# Patient Record
Sex: MALE | Race: WHITE | NOT HISPANIC OR LATINO | Employment: OTHER | URBAN - METROPOLITAN AREA
[De-identification: names, ages, dates, MRNs, and addresses within clinical notes are randomized per-mention and may not be internally consistent; named-entity substitution may affect disease eponyms.]

---

## 2023-10-04 PROBLEM — R13.12 DYSPHAGIA, OROPHARYNGEAL PHASE: Status: ACTIVE | Noted: 2023-10-04

## 2023-10-04 PROBLEM — J31.0 CHRONIC RHINITIS: Status: ACTIVE | Noted: 2023-10-04

## 2023-10-04 PROBLEM — J38.7 VALLECULAR CYST: Status: ACTIVE | Noted: 2023-10-04

## 2023-10-04 RX ORDER — MIRTAZAPINE 15 MG/1
TABLET, FILM COATED ORAL
COMMUNITY
Start: 2023-04-25

## 2023-10-04 RX ORDER — TAMSULOSIN HYDROCHLORIDE 0.4 MG/1
CAPSULE ORAL
COMMUNITY
Start: 2020-06-25 | End: 2023-10-06 | Stop reason: ALTCHOICE

## 2023-10-04 RX ORDER — VIT C/E/ZN/COPPR/LUTEIN/ZEAXAN 250MG-90MG
CAPSULE ORAL DAILY
COMMUNITY
End: 2024-04-05 | Stop reason: ALTCHOICE

## 2023-10-04 RX ORDER — CETIRIZINE HYDROCHLORIDE 10 MG/1
1 TABLET ORAL DAILY
COMMUNITY
Start: 2023-05-25

## 2023-10-04 RX ORDER — POLYETHYLENE GLYCOL 3350 17 G/17G
POWDER, FOR SOLUTION ORAL
COMMUNITY
Start: 2023-04-19 | End: 2024-04-05 | Stop reason: ALTCHOICE

## 2023-10-04 RX ORDER — HYDROCORTISONE ACETATE 25 MG/1
SUPPOSITORY RECTAL
COMMUNITY
Start: 2023-03-15 | End: 2024-04-05 | Stop reason: ALTCHOICE

## 2023-10-04 RX ORDER — SENNOSIDES 8.6 MG/1
TABLET ORAL
COMMUNITY
Start: 2023-04-19

## 2023-10-04 RX ORDER — ROSUVASTATIN CALCIUM 20 MG/1
TABLET, COATED ORAL DAILY
COMMUNITY
Start: 2008-01-29

## 2023-10-04 RX ORDER — MULTIVIT-MIN/IRON/FOLIC ACID/K 18-600-40
CAPSULE ORAL
COMMUNITY
Start: 2020-06-25

## 2023-10-04 RX ORDER — WHEAT DEXTRIN 3 G/3.5 G
POWDER IN PACKET (EA) ORAL AS NEEDED
COMMUNITY
End: 2023-10-06

## 2023-10-04 RX ORDER — MONTELUKAST SODIUM 10 MG/1
1 TABLET ORAL DAILY
COMMUNITY
Start: 2023-05-03 | End: 2024-04-05 | Stop reason: ALTCHOICE

## 2023-10-04 RX ORDER — PHENOL 1.4 %
AEROSOL, SPRAY (ML) MUCOUS MEMBRANE
COMMUNITY
Start: 2020-06-25

## 2023-10-04 RX ORDER — PROPRANOLOL HYDROCHLORIDE 20 MG/1
TABLET ORAL
COMMUNITY
Start: 2020-06-25 | End: 2023-10-06 | Stop reason: ALTCHOICE

## 2023-10-04 RX ORDER — CARBIDOPA AND LEVODOPA 25; 250 MG/1; MG/1
TABLET ORAL
COMMUNITY
Start: 2020-07-17 | End: 2023-10-06 | Stop reason: DRUGHIGH

## 2023-10-04 RX ORDER — OMEPRAZOLE 20 MG/1
CAPSULE, DELAYED RELEASE ORAL 2 TIMES DAILY
COMMUNITY
Start: 2020-06-25 | End: 2024-04-05 | Stop reason: ALTCHOICE

## 2023-10-04 RX ORDER — LORATADINE 10 MG/1
1 TABLET ORAL DAILY
COMMUNITY
Start: 2022-04-22 | End: 2024-04-05 | Stop reason: ALTCHOICE

## 2023-10-04 RX ORDER — NAPROXEN SODIUM 220 MG/1
TABLET ORAL
COMMUNITY
Start: 2020-06-25

## 2023-10-04 RX ORDER — ASPIRIN 81 MG/1
TABLET ORAL DAILY
COMMUNITY
Start: 2008-01-29

## 2023-10-06 ENCOUNTER — OFFICE VISIT (OUTPATIENT)
Dept: PRIMARY CARE | Facility: CLINIC | Age: 83
End: 2023-10-06
Payer: COMMERCIAL

## 2023-10-06 VITALS
SYSTOLIC BLOOD PRESSURE: 110 MMHG | TEMPERATURE: 97.3 F | DIASTOLIC BLOOD PRESSURE: 70 MMHG | RESPIRATION RATE: 16 BRPM | BODY MASS INDEX: 26.78 KG/M2 | HEART RATE: 60 BPM | WEIGHT: 171 LBS

## 2023-10-06 DIAGNOSIS — K21.9 GASTROESOPHAGEAL REFLUX DISEASE, UNSPECIFIED WHETHER ESOPHAGITIS PRESENT: ICD-10-CM

## 2023-10-06 DIAGNOSIS — E78.5 HYPERLIPIDEMIA, UNSPECIFIED HYPERLIPIDEMIA TYPE: ICD-10-CM

## 2023-10-06 DIAGNOSIS — J31.0 CHRONIC RHINITIS: ICD-10-CM

## 2023-10-06 DIAGNOSIS — G20.A1 PARKINSON'S DISEASE, UNSPECIFIED WHETHER DYSKINESIA PRESENT, UNSPECIFIED WHETHER MANIFESTATIONS FLUCTUATE (MULTI): ICD-10-CM

## 2023-10-06 DIAGNOSIS — I10 ESSENTIAL HYPERTENSION: ICD-10-CM

## 2023-10-06 DIAGNOSIS — I25.10 CORONARY ARTERIOSCLEROSIS: Primary | ICD-10-CM

## 2023-10-06 PROCEDURE — 1159F MED LIST DOCD IN RCRD: CPT | Performed by: FAMILY MEDICINE

## 2023-10-06 PROCEDURE — 1036F TOBACCO NON-USER: CPT | Performed by: FAMILY MEDICINE

## 2023-10-06 PROCEDURE — 3078F DIAST BP <80 MM HG: CPT | Performed by: FAMILY MEDICINE

## 2023-10-06 PROCEDURE — 99213 OFFICE O/P EST LOW 20 MIN: CPT | Performed by: FAMILY MEDICINE

## 2023-10-06 PROCEDURE — 1160F RVW MEDS BY RX/DR IN RCRD: CPT | Performed by: FAMILY MEDICINE

## 2023-10-06 PROCEDURE — 3074F SYST BP LT 130 MM HG: CPT | Performed by: FAMILY MEDICINE

## 2023-10-06 RX ORDER — CARBIDOPA AND LEVODOPA 25; 250 MG/1; MG/1
1.5 TABLET ORAL 4 TIMES DAILY
Qty: 180 TABLET | Refills: 0 | Status: SHIPPED | OUTPATIENT
Start: 2023-10-06 | End: 2024-04-05

## 2023-10-06 NOTE — PROGRESS NOTES
Subjective   Patient ID: Luis Armando Malin is a 83 y.o. male who presents for Follow-up (4 mon fu ).  HPI  patient with hx of parkinson disease and DVT post treatment   here for follow up visit   feeling well   other than congestio   no cough , no SOB , no fever, no chills.     congested mianly in the morning   he has post nasal drip becuase of clering his throat breasting is fine , no fever.   He is tired all the time     [facility] , [facility] , [facility] , meloixam   No changes in meds     Jarvis is a  , [facility]       Review of Systems    Past Medical History:   Diagnosis Date    Heart disease, unspecified     Heart problem    Personal history of other diseases of the nervous system and sense organs     History of Parkinson's disease    Personal history of other endocrine, nutritional and metabolic disease     History of high cholesterol       Past Surgical History:   Procedure Laterality Date    OTHER SURGICAL HISTORY  06/25/2020    Hernia repair    OTHER SURGICAL HISTORY  06/25/2020    Gallbladder surgery    OTHER SURGICAL HISTORY  06/25/2020    Shoulder surgery    OTHER SURGICAL HISTORY  06/25/2020    Sinus surgery    OTHER SURGICAL HISTORY  06/25/2020    Elbow surgery      Social History     Socioeconomic History    Marital status:      Spouse name: None    Number of children: None    Years of education: None    Highest education level: None   Occupational History    None   Tobacco Use    Smoking status: Never    Smokeless tobacco: Never   Substance and Sexual Activity    Alcohol use: None    Drug use: None    Sexual activity: None   Other Topics Concern    None   Social History Narrative    None     Social Determinants of Health     Financial Resource Strain: Not on file   Food Insecurity: Not on file   Transportation Needs: Not on file   Physical Activity: Not on file   Stress: Not on file   Social Connections: Not on file   Intimate Partner Violence: Not on file   Housing  Stability: Not on file      Family History   Problem Relation Name Age of Onset    Cancer Father         MEDICATIONS AND ALLERGIES:    ALLERGIES Atorvastatin, Clarithromycin, and Penicillins    MEDICATIONS   Current Outpatient Medications on File Prior to Visit   Medication Sig Dispense Refill    ascorbic acid, vitamin C, 500 mg capsule Take by mouth.      aspirin 81 mg EC tablet Take by mouth once daily.      cetirizine (ZyrTEC) 10 mg tablet Take 1 tablet (10 mg) by mouth once daily.      cholecalciferol (Vitamin D-3) 25 MCG (1000 UT) capsule Take by mouth once daily.      hydrocortisone (Anusol-HC) 25 mg suppository UNWRAP AND INSERT 1 SUPPOSITORY IN RECTUM THREE TIMES A DAY AS NEEDED FOR HEMORRHOIDS      loratadine (Claritin) 10 mg tablet Take 1 tablet (10 mg) by mouth once daily.      mirtazapine (Remeron) 15 mg tablet Take 1/2 Pill AT Bedtime For 2 Weeks Then Increase To A Full Pill AT Bedtime      montelukast (Singulair) 10 mg tablet Take 1 tablet (10 mg) by mouth once daily.      multivitamin with minerals (Adults Multivitamin) tablet Take by mouth.      NON FORMULARY Vitamin c caps      omega 3-dha-epa-fish oil (Fish OiL) 1,200 (144-216) mg capsule Take by mouth.      omeprazole (PriLOSEC) 20 mg DR capsule Take by mouth 2 times a day.      polyethylene glycol (Glycolax, Miralax) 17 gram/dose powder STIR 1 CAPFUL (17GM) IN 8 OUNCES OF LIQUID AND TAKE BY MOUTH TWICE A DAY FOR CONSTIPATION *17GM EQUALS 1 CAPFUL OR 1 PACKET. MIX WITH 8 OUNCES OF LIQUID AND TAKE BY MOUTH AS INSTRUCTED ON PACKAGING*      prucalopride (Motegrity) 1 mg tablet tablet Take 1 tablet (1 mg) by mouth once daily. Every afternoon      psyllium (Metamucil) 3.4 gram packet Take by mouth.      rosuvastatin (Crestor) 20 mg tablet Take by mouth once daily.      sennosides (Senokot) 8.6 mg tablet Take by mouth.      [DISCONTINUED] carbidopa-levodopa (Sinemet)  mg tablet Take by mouth.      [DISCONTINUED] propranolol (Inderal) 20 mg tablet  Take by mouth.      [DISCONTINUED] SAW PALMETTO ORAL Take by mouth.      [DISCONTINUED] tamsulosin (Flomax) 0.4 mg 24 hr capsule Take by mouth.      [DISCONTINUED] wheat dextrin (Benefiber Clear SF, dextrin,) 3 gram/3.5 gram powder in packet if needed.       No current facility-administered medications on file prior to visit.        Objective   Visit Vitals  /70   Pulse 60   Temp 36.3 °C (97.3 °F)   Resp 16   Wt 77.6 kg (171 lb)   BMI 26.78 kg/m²   Smoking Status Never   BSA 1.92 m²      Physical Exam  Constitutional:       Appearance: Normal appearance.   HENT:      Head: Normocephalic and atraumatic.   Eyes:      Pupils: Pupils are equal, round, and reactive to light.   Cardiovascular:      Rate and Rhythm: Normal rate.   Pulmonary:      Effort: Pulmonary effort is normal.   Musculoskeletal:         General: No swelling.      Cervical back: Normal range of motion.   Skin:     Coloration: Skin is not jaundiced.   Neurological:      General: No focal deficit present.      Mental Status: He is alert and oriented to person, place, and time.             1. Coronary arteriosclerosis        2. Essential hypertension        3. Hyperlipidemia, unspecified hyperlipidemia type        4. Chronic rhinitis        5. Gastroesophageal reflux disease, unspecified whether esophagitis present        6. Parkinson's disease, unspecified whether dyskinesia present, unspecified whether manifestations fluctuate          1. Coronary arteriosclerosis  Following with cardilgy   No acute complaints.     2. Essential hypertension  Well controlled   Continue current treamtent    3. Hyperlipidemia, unspecified hyperlipidemia type  Stable   Labs reviewed in the normal range.     4. Chronic rhinitis  Resolved for now     5. Gastroesophageal reflux disease, unspecified whether esophagitis present  Stable     6. Parkinson's disease, unspecified whether dyskinesia present, unspecified whether manifestations fluctuate  Following with neurogoy

## 2023-11-05 DIAGNOSIS — K21.9 GASTROESOPHAGEAL REFLUX DISEASE, UNSPECIFIED WHETHER ESOPHAGITIS PRESENT: Primary | ICD-10-CM

## 2023-11-06 RX ORDER — OMEPRAZOLE 40 MG/1
CAPSULE, DELAYED RELEASE ORAL
Qty: 90 CAPSULE | Refills: 0 | Status: SHIPPED | OUTPATIENT
Start: 2023-11-06 | End: 2024-02-02

## 2023-11-29 ENCOUNTER — OFFICE VISIT (OUTPATIENT)
Dept: PRIMARY CARE | Facility: CLINIC | Age: 83
End: 2023-11-29
Payer: COMMERCIAL

## 2023-11-29 VITALS
BODY MASS INDEX: 28.19 KG/M2 | TEMPERATURE: 97.1 F | SYSTOLIC BLOOD PRESSURE: 120 MMHG | DIASTOLIC BLOOD PRESSURE: 74 MMHG | WEIGHT: 180 LBS | HEART RATE: 62 BPM | RESPIRATION RATE: 16 BRPM

## 2023-11-29 DIAGNOSIS — M25.511 ACUTE PAIN OF RIGHT SHOULDER: Primary | ICD-10-CM

## 2023-11-29 PROCEDURE — 3074F SYST BP LT 130 MM HG: CPT | Performed by: FAMILY MEDICINE

## 2023-11-29 PROCEDURE — 3078F DIAST BP <80 MM HG: CPT | Performed by: FAMILY MEDICINE

## 2023-11-29 PROCEDURE — 1159F MED LIST DOCD IN RCRD: CPT | Performed by: FAMILY MEDICINE

## 2023-11-29 PROCEDURE — 1160F RVW MEDS BY RX/DR IN RCRD: CPT | Performed by: FAMILY MEDICINE

## 2023-11-29 PROCEDURE — 99213 OFFICE O/P EST LOW 20 MIN: CPT | Performed by: FAMILY MEDICINE

## 2023-11-29 PROCEDURE — 1036F TOBACCO NON-USER: CPT | Performed by: FAMILY MEDICINE

## 2023-11-29 RX ORDER — METHYLPREDNISOLONE 4 MG/1
TABLET ORAL
Qty: 21 TABLET | Refills: 0 | Status: SHIPPED | OUTPATIENT
Start: 2023-11-29 | End: 2023-12-06

## 2023-11-29 NOTE — PROGRESS NOTES
Subjective   Patient ID: Luis Armando Malin is a 83 y.o. male who presents for Follow-up (Soreness under right armpit , pain off and on ).  HPI  Pain the right axilla   No lesion seen   No lumph nodes   More with moving the arms in certain mvts   No signs of infection     Review of Systems    Past Medical History:   Diagnosis Date    Heart disease, unspecified     Heart problem    Personal history of other diseases of the nervous system and sense organs     History of Parkinson's disease    Personal history of other endocrine, nutritional and metabolic disease     History of high cholesterol       Past Surgical History:   Procedure Laterality Date    OTHER SURGICAL HISTORY  06/25/2020    Hernia repair    OTHER SURGICAL HISTORY  06/25/2020    Gallbladder surgery    OTHER SURGICAL HISTORY  06/25/2020    Shoulder surgery    OTHER SURGICAL HISTORY  06/25/2020    Sinus surgery    OTHER SURGICAL HISTORY  06/25/2020    Elbow surgery      Social History     Socioeconomic History    Marital status:      Spouse name: None    Number of children: None    Years of education: None    Highest education level: None   Occupational History    None   Tobacco Use    Smoking status: Never    Smokeless tobacco: Never   Substance and Sexual Activity    Alcohol use: None    Drug use: None    Sexual activity: None   Other Topics Concern    None   Social History Narrative    None     Social Determinants of Health     Financial Resource Strain: Not on file   Food Insecurity: Not on file   Transportation Needs: Not on file   Physical Activity: Not on file   Stress: Not on file   Social Connections: Not on file   Intimate Partner Violence: Not on file   Housing Stability: Not on file      Family History   Problem Relation Name Age of Onset    Cancer Father         MEDICATIONS AND ALLERGIES:    ALLERGIES Atorvastatin, Clarithromycin, and Penicillins    MEDICATIONS   Current Outpatient Medications on File Prior to Visit   Medication Sig  Dispense Refill    ascorbic acid, vitamin C, 500 mg capsule Take by mouth.      aspirin 81 mg EC tablet Take by mouth once daily.      carbidopa-levodopa (Sinemet)  mg tablet Take 1.5 tablets by mouth 4 times a day. 180 tablet 0    cetirizine (ZyrTEC) 10 mg tablet Take 1 tablet (10 mg) by mouth once daily.      cholecalciferol (Vitamin D-3) 25 MCG (1000 UT) capsule Take by mouth once daily.      hydrocortisone (Anusol-HC) 25 mg suppository UNWRAP AND INSERT 1 SUPPOSITORY IN RECTUM THREE TIMES A DAY AS NEEDED FOR HEMORRHOIDS      loratadine (Claritin) 10 mg tablet Take 1 tablet (10 mg) by mouth once daily.      mirtazapine (Remeron) 15 mg tablet Take 1/2 Pill AT Bedtime For 2 Weeks Then Increase To A Full Pill AT Bedtime      montelukast (Singulair) 10 mg tablet Take 1 tablet (10 mg) by mouth once daily.      multivitamin with minerals (Adults Multivitamin) tablet Take by mouth.      NON FORMULARY Vitamin c caps      omega 3-dha-epa-fish oil (Fish OiL) 1,200 (144-216) mg capsule Take by mouth.      omeprazole (PriLOSEC) 20 mg DR capsule Take by mouth 2 times a day.      omeprazole (PriLOSEC) 40 mg DR capsule 1 BY MOUTH ONCE DAILY ( INCREASED FROM 20MG ON 6/9/23) 90 capsule 0    polyethylene glycol (Glycolax, Miralax) 17 gram/dose powder STIR 1 CAPFUL (17GM) IN 8 OUNCES OF LIQUID AND TAKE BY MOUTH TWICE A DAY FOR CONSTIPATION *17GM EQUALS 1 CAPFUL OR 1 PACKET. MIX WITH 8 OUNCES OF LIQUID AND TAKE BY MOUTH AS INSTRUCTED ON PACKAGING*      prucalopride (Motegrity) 1 mg tablet tablet Take 1 tablet (1 mg) by mouth once daily. Every afternoon      psyllium (Metamucil) 3.4 gram packet Take by mouth.      rosuvastatin (Crestor) 20 mg tablet Take by mouth once daily.      sennosides (Senokot) 8.6 mg tablet Take by mouth.       No current facility-administered medications on file prior to visit.        Objective   Visit Vitals  /74   Pulse 62   Temp 36.2 °C (97.1 °F)   Resp 16   Wt 81.6 kg (180 lb)   BMI 28.19  kg/m²   Smoking Status Never   BSA 1.96 m²      Physical Exam  Pain on palpating the R axilla   No masses felt.     1. Acute pain of right shoulder  Pain in the axialla   No lesions seen   Will treat with medrol dose pack   Advised strecthing exericse   Contact us if not getting better for further testing   Patient is agreable.   - methylPREDNISolone (Medrol Dospak) 4 mg tablets; Take as directed on package.  Dispense: 21 tablet; Refill: 0

## 2024-02-02 DIAGNOSIS — K21.9 GASTROESOPHAGEAL REFLUX DISEASE, UNSPECIFIED WHETHER ESOPHAGITIS PRESENT: ICD-10-CM

## 2024-02-02 RX ORDER — OMEPRAZOLE 40 MG/1
CAPSULE, DELAYED RELEASE ORAL
Qty: 90 CAPSULE | Refills: 0 | Status: SHIPPED | OUTPATIENT
Start: 2024-02-02 | End: 2024-04-05 | Stop reason: SDUPTHER

## 2024-04-05 ENCOUNTER — OFFICE VISIT (OUTPATIENT)
Dept: PRIMARY CARE | Facility: CLINIC | Age: 84
End: 2024-04-05
Payer: COMMERCIAL

## 2024-04-05 VITALS
HEART RATE: 70 BPM | DIASTOLIC BLOOD PRESSURE: 82 MMHG | SYSTOLIC BLOOD PRESSURE: 126 MMHG | BODY MASS INDEX: 28.09 KG/M2 | TEMPERATURE: 98 F | WEIGHT: 179 LBS | HEIGHT: 67 IN

## 2024-04-05 DIAGNOSIS — G47.00 INSOMNIA, UNSPECIFIED TYPE: ICD-10-CM

## 2024-04-05 DIAGNOSIS — K58.9 IRRITABLE BOWEL SYNDROME, UNSPECIFIED TYPE: ICD-10-CM

## 2024-04-05 DIAGNOSIS — E78.00 PURE HYPERCHOLESTEROLEMIA: ICD-10-CM

## 2024-04-05 DIAGNOSIS — M47.26 OSTEOARTHRITIS OF SPINE WITH RADICULOPATHY, LUMBAR REGION: ICD-10-CM

## 2024-04-05 DIAGNOSIS — H91.93 BILATERAL HEARING LOSS, UNSPECIFIED HEARING LOSS TYPE: ICD-10-CM

## 2024-04-05 DIAGNOSIS — I10 BENIGN ESSENTIAL HYPERTENSION: ICD-10-CM

## 2024-04-05 DIAGNOSIS — G20.B1 PARKINSON'S DISEASE WITH DYSKINESIA WITHOUT FLUCTUATING MANIFESTATIONS (MULTI): ICD-10-CM

## 2024-04-05 DIAGNOSIS — G89.29 CHRONIC RIGHT-SIDED LOW BACK PAIN WITH RIGHT-SIDED SCIATICA: ICD-10-CM

## 2024-04-05 DIAGNOSIS — I25.10 CORONARY ARTERY DISEASE DUE TO LIPID RICH PLAQUE: ICD-10-CM

## 2024-04-05 DIAGNOSIS — R53.83 OTHER FATIGUE: ICD-10-CM

## 2024-04-05 DIAGNOSIS — Z00.00 ENCOUNTER FOR SUBSEQUENT ANNUAL WELLNESS VISIT (AWV) IN MEDICARE PATIENT: Primary | ICD-10-CM

## 2024-04-05 DIAGNOSIS — G47.33 OBSTRUCTIVE SLEEP APNEA SYNDROME: ICD-10-CM

## 2024-04-05 DIAGNOSIS — J32.9 CHRONIC SINUSITIS, UNSPECIFIED LOCATION: ICD-10-CM

## 2024-04-05 DIAGNOSIS — M54.41 CHRONIC RIGHT-SIDED LOW BACK PAIN WITH RIGHT-SIDED SCIATICA: ICD-10-CM

## 2024-04-05 DIAGNOSIS — I25.83 CORONARY ARTERY DISEASE DUE TO LIPID RICH PLAQUE: ICD-10-CM

## 2024-04-05 DIAGNOSIS — E78.5 HYPERLIPIDEMIA, UNSPECIFIED HYPERLIPIDEMIA TYPE: ICD-10-CM

## 2024-04-05 DIAGNOSIS — N40.0 BENIGN PROSTATIC HYPERPLASIA WITHOUT LOWER URINARY TRACT SYMPTOMS: ICD-10-CM

## 2024-04-05 DIAGNOSIS — K21.9 GASTROESOPHAGEAL REFLUX DISEASE, UNSPECIFIED WHETHER ESOPHAGITIS PRESENT: ICD-10-CM

## 2024-04-05 DIAGNOSIS — K64.8 OTHER HEMORRHOIDS: ICD-10-CM

## 2024-04-05 DIAGNOSIS — R13.12 OROPHARYNGEAL DYSPHAGIA: ICD-10-CM

## 2024-04-05 PROBLEM — K59.00 CONSTIPATION: Status: ACTIVE | Noted: 2021-09-28

## 2024-04-05 PROBLEM — H91.90 HEARING LOSS: Status: ACTIVE | Noted: 2021-09-28

## 2024-04-05 PROCEDURE — 99213 OFFICE O/P EST LOW 20 MIN: CPT | Performed by: FAMILY MEDICINE

## 2024-04-05 PROCEDURE — 99397 PER PM REEVAL EST PAT 65+ YR: CPT | Performed by: FAMILY MEDICINE

## 2024-04-05 PROCEDURE — 1036F TOBACCO NON-USER: CPT | Performed by: FAMILY MEDICINE

## 2024-04-05 PROCEDURE — 1170F FXNL STATUS ASSESSED: CPT | Performed by: FAMILY MEDICINE

## 2024-04-05 PROCEDURE — 3079F DIAST BP 80-89 MM HG: CPT | Performed by: FAMILY MEDICINE

## 2024-04-05 PROCEDURE — G0439 PPPS, SUBSEQ VISIT: HCPCS | Performed by: FAMILY MEDICINE

## 2024-04-05 PROCEDURE — G0444 DEPRESSION SCREEN ANNUAL: HCPCS | Performed by: FAMILY MEDICINE

## 2024-04-05 PROCEDURE — 1159F MED LIST DOCD IN RCRD: CPT | Performed by: FAMILY MEDICINE

## 2024-04-05 PROCEDURE — 3074F SYST BP LT 130 MM HG: CPT | Performed by: FAMILY MEDICINE

## 2024-04-05 RX ORDER — OMEPRAZOLE 40 MG/1
CAPSULE, DELAYED RELEASE ORAL
Qty: 90 CAPSULE | Refills: 3 | Status: SHIPPED | OUTPATIENT
Start: 2024-04-05

## 2024-04-05 RX ORDER — ZINC GLUCONATE 50 MG
TABLET ORAL EVERY 24 HOURS
COMMUNITY

## 2024-04-05 ASSESSMENT — ACTIVITIES OF DAILY LIVING (ADL)
BATHING: NEEDS ASSISTANCE
DRESSING: NEEDS ASSISTANCE
TAKING_MEDICATION: INDEPENDENT
DOING_HOUSEWORK: INDEPENDENT
MANAGING_FINANCES: INDEPENDENT
GROCERY_SHOPPING: INDEPENDENT

## 2024-04-05 ASSESSMENT — ENCOUNTER SYMPTOMS
LOSS OF SENSATION IN FEET: 0
DEPRESSION: 0
OCCASIONAL FEELINGS OF UNSTEADINESS: 0

## 2024-04-05 ASSESSMENT — PATIENT HEALTH QUESTIONNAIRE - PHQ9
2. FEELING DOWN, DEPRESSED OR HOPELESS: NOT AT ALL
SUM OF ALL RESPONSES TO PHQ9 QUESTIONS 1 AND 2: 0
1. LITTLE INTEREST OR PLEASURE IN DOING THINGS: NOT AT ALL

## 2024-04-05 NOTE — PROGRESS NOTES
Subjective   Patient ID: Luis Armando Malin is a 83 y.o. male who presents for Medicare Annual Wellness Visit Subsequent.  HPIPatient presenting for AWV   Patient is a  with hx of DVT , treated with eliquis , completed treatment., CAD post stenting 20 years ago , PARKINSON's Disease    Saw urologist and every thing was fine Dr. Lutz   Saw his cardiologist for cardiac stnet 20 years ago Dr. Luan Britton [facility]   Neurologist Dr. Grant is his neurologist with [facility]      he had CT scan last year for the abdomen and was reassuring     [facility]    usually wears a hearing aid.     he had colonoscopy 2021 and no indication to repeat due to age.       Patient was instructed on regular exercise, watching low fat diet, and getting the proper amount of sleep.  Screening tests appropriate to the age group were discussed including colon cancer screening, mammography and pap (if female), prostate testing ( if male), as well as bone density testing in the appropriate age group. Reviewed with patient the recommended immunization schedule.     PREVENTATIVE ISSUES REVIEWED    REVIEWED VACCINATIONS   ENCOURAGE HEALTHY EATING AND REGULAR EXERCISE   ENCOURAGE SLEEPING 7-8 HOURS AT NIGHT AND STAYING WELL HYDRATED     COLONOSCOPY DONE 2021 and [facility] recommended no further testing due to age    LABS YEARLY AND DONE RECENTLY AND REVIEWED                                EYE CHECK YEARLY      URINE GLADYS IS GOOD , SAW UROLOGIST 1 MONTH AGO .     MIRALAX , METAMUCIL , PRUNE JUICE CONTROLS HIS CONSTIPATION .     HE IS IS GOOD SPIRITS.     [facility]     Review of Systems    Past Medical History:   Diagnosis Date    Heart disease, unspecified     Heart problem    Personal history of other diseases of the nervous system and sense organs     History of Parkinson's disease    Personal history of other endocrine, nutritional and metabolic disease     History of  high cholesterol       Past Surgical History:   Procedure Laterality Date    OTHER SURGICAL HISTORY  06/25/2020    Hernia repair    OTHER SURGICAL HISTORY  06/25/2020    Gallbladder surgery    OTHER SURGICAL HISTORY  06/25/2020    Shoulder surgery    OTHER SURGICAL HISTORY  06/25/2020    Sinus surgery    OTHER SURGICAL HISTORY  06/25/2020    Elbow surgery      Social History     Socioeconomic History    Marital status:      Spouse name: None    Number of children: None    Years of education: None    Highest education level: None   Occupational History    None   Tobacco Use    Smoking status: Former     Types: Cigarettes     Passive exposure: Past    Smokeless tobacco: Never   Substance and Sexual Activity    Alcohol use: Yes    Drug use: Never    Sexual activity: None   Other Topics Concern    None   Social History Narrative    None     Social Determinants of Health     Financial Resource Strain: Not on file   Food Insecurity: Not on file   Transportation Needs: Not on file   Physical Activity: Not on file   Stress: Not on file   Social Connections: Not on file   Intimate Partner Violence: Not on file   Housing Stability: Not on file      Family History   Problem Relation Name Age of Onset    Cancer Father         MEDICATIONS AND ALLERGIES:    ALLERGIES Atorvastatin, Clarithromycin, and Penicillins    MEDICATIONS   Current Outpatient Medications on File Prior to Visit   Medication Sig Dispense Refill    ascorbic acid, vitamin C, 500 mg capsule Take by mouth.      aspirin 81 mg EC tablet Take by mouth once daily.      carbidopa-levodopa (Sinemet)  mg tablet Take 1.5 tablets by mouth 4 times a day. 180 tablet 0    cetirizine (ZyrTEC) 10 mg tablet Take 1 tablet (10 mg) by mouth once daily.      mirtazapine (Remeron) 15 mg tablet Take 1/2 Pill AT Bedtime For 2 Weeks Then Increase To A Full Pill AT Bedtime      multivitamin with minerals (Adults Multivitamin) tablet Take by mouth.      omega 3-dha-epa-fish  "oil (Fish OiL) 1,200 (144-216) mg capsule Take by mouth.      omeprazole (PriLOSEC) 40 mg DR capsule 1 BY MOUTH ONCE DAILY ( INCREASED FROM 20MG ON 6/9/23) 90 capsule 0    plecanatide (Trulance) tablet tablet 1 tablet (3 mg).      rosuvastatin (Crestor) 20 mg tablet Take by mouth once daily.      sennosides (Senokot) 8.6 mg tablet Take by mouth.      zinc gluconate 50 mg tablet once every 24 hours.      [DISCONTINUED] cholecalciferol (Vitamin D-3) 25 MCG (1000 UT) capsule Take by mouth once daily.      [DISCONTINUED] hydrocortisone (Anusol-HC) 25 mg suppository UNWRAP AND INSERT 1 SUPPOSITORY IN RECTUM THREE TIMES A DAY AS NEEDED FOR HEMORRHOIDS      [DISCONTINUED] loratadine (Claritin) 10 mg tablet Take 1 tablet (10 mg) by mouth once daily.      [DISCONTINUED] montelukast (Singulair) 10 mg tablet Take 1 tablet (10 mg) by mouth once daily.      [DISCONTINUED] NON FORMULARY Vitamin c caps      [DISCONTINUED] omeprazole (PriLOSEC) 20 mg DR capsule Take by mouth 2 times a day.      [DISCONTINUED] polyethylene glycol (Glycolax, Miralax) 17 gram/dose powder STIR 1 CAPFUL (17GM) IN 8 OUNCES OF LIQUID AND TAKE BY MOUTH TWICE A DAY FOR CONSTIPATION *17GM EQUALS 1 CAPFUL OR 1 PACKET. MIX WITH 8 OUNCES OF LIQUID AND TAKE BY MOUTH AS INSTRUCTED ON PACKAGING*      [DISCONTINUED] prucalopride (Motegrity) 1 mg tablet tablet Take 1 tablet (1 mg) by mouth once daily. Every afternoon      [DISCONTINUED] psyllium (Metamucil) 3.4 gram packet Take by mouth.       No current facility-administered medications on file prior to visit.        Objective   Visit Vitals  /82   Pulse 70   Temp 36.7 °C (98 °F)   Ht 1.702 m (5' 7\")   Wt 81.2 kg (179 lb)   BMI 28.04 kg/m²   Smoking Status Former   BSA 1.96 m²          6/25/2020     3:42 PM 7/17/2020    11:07 AM 10/6/2023     1:01 PM 11/29/2023     9:51 AM 4/5/2024    10:59 AM   Vitals   Systolic 128 119 110 120 126   Diastolic 74 70 70 74 82   Heart Rate 50 61 60 62 70   Temp 36.2 °C (97.1 " "°F) 35.9 °C (96.7 °F) 36.3 °C (97.3 °F) 36.2 °C (97.1 °F) 36.7 °C (98 °F)   Resp   16 16    Height (in) 1.702 m (5' 7\") 1.702 m (5' 7\")   1.702 m (5' 7\")   Weight (lb) 175 175 171 180 179   BMI 27.41 kg/m2 27.41 kg/m2 26.78 kg/m2 28.19 kg/m2 28.04 kg/m2   BSA (m2) 1.94 m2 1.94 m2 1.92 m2 1.96 m2 1.96 m2   Visit Report   Report Report Report     Physical Exam  Constitutional:       Appearance: Normal appearance. He is normal weight.   HENT:      Head: Normocephalic.      Right Ear: Tympanic membrane normal.      Left Ear: Tympanic membrane normal.      Nose: Nose normal.      Mouth/Throat:      Pharynx: Oropharynx is clear.   Eyes:      Pupils: Pupils are equal, round, and reactive to light.   Cardiovascular:      Rate and Rhythm: Normal rate and regular rhythm.      Pulses: Normal pulses.      Heart sounds: Normal heart sounds.   Pulmonary:      Effort: Pulmonary effort is normal.      Breath sounds: Normal breath sounds. No stridor. No rhonchi.   Abdominal:      General: There is no distension.   Musculoskeletal:         General: No swelling or tenderness.   Skin:     Coloration: Skin is not jaundiced or pale.   Neurological:      General: No focal deficit present.      Mental Status: He is alert and oriented to person, place, and time. Mental status is at baseline.      Cranial Nerves: No cranial nerve deficit.      Sensory: No sensory deficit.      Motor: No weakness.      Coordination: Coordination normal.      Gait: Gait normal.      Deep Tendon Reflexes: Reflexes normal.   Psychiatric:         Mood and Affect: Mood normal.         Behavior: Behavior normal.         Thought Content: Thought content normal.         Judgment: Judgment normal.       1. Encounter for subsequent annual wellness visit (AWV) in Medicare patient  Risk Factors Identified During Visit: multiple medical conditions , including parkinson , hx of blood clots.   Influenza:  yearly   Pneumovax 23:UTD  Prevnar: UTD  Shingles Vaccine: " UTD  Prostate cancer screening: following with urology   Colorectal Cancer Screenin , no indication to repeat due to age   Abdominal Aortic Aneurysm screening: CT scan from  no aneurysm   Code status :  full code.     2. Gastroesophageal reflux disease, unspecified whether esophagitis present  Continue omeprazole   - omeprazole (PriLOSEC) 40 mg DR capsule; 1 BY MOUTH ONCE DAILY ( INCREASED FROM 20MG ON 23)  Dispense: 90 capsule; Refill: 3    3. Chronic sinusitis, unspecified location  No acute complaints.     4. Coronary artery disease due to lipid rich plaque  Stable, following with cardiology     5. Oropharyngeal dysphagia  resolved    6. Benign essential hypertension  Well controlled     7. Hyperlipidemia, unspecified hyperlipidemia type  Stable , [facility]     8. Insomnia, unspecified type  No acute complaints.     9. Obstructive sleep apnea syndrome  Tolerating treatment     10. Osteoarthritis of spine with radiculopathy, lumbar region  Stable     11. Pure hypercholesterolemia  Will request [facility]     12. Parkinson's disease with dyskinesia without fluctuating manifestations (CMS/HCC)  Stable, following with neurology     13. Other hemorrhoids  No acute coplaints, last Colonoscopy     14. Benign prostatic hyperplasia without lower urinary tract symptoms  Following with urology     15. Irritable bowel syndrome, unspecified type  stable    16. Chronic right-sided low back pain with right-sided sciatica  No acute ocmpalints     17. Other fatigue  better    18. Bilateral hearing loss, unspecified hearing loss type  [employer] hearing aid.     Not feeling depressed.

## 2024-07-24 ENCOUNTER — OFFICE VISIT (OUTPATIENT)
Dept: PRIMARY CARE | Facility: CLINIC | Age: 84
End: 2024-07-24
Payer: COMMERCIAL

## 2024-07-24 ENCOUNTER — LAB (OUTPATIENT)
Dept: LAB | Facility: LAB | Age: 84
End: 2024-07-24
Payer: COMMERCIAL

## 2024-07-24 ENCOUNTER — TELEPHONE (OUTPATIENT)
Dept: PRIMARY CARE | Facility: CLINIC | Age: 84
End: 2024-07-24

## 2024-07-24 VITALS
HEART RATE: 64 BPM | RESPIRATION RATE: 16 BRPM | BODY MASS INDEX: 26.63 KG/M2 | WEIGHT: 170 LBS | TEMPERATURE: 97.7 F | SYSTOLIC BLOOD PRESSURE: 128 MMHG | DIASTOLIC BLOOD PRESSURE: 80 MMHG

## 2024-07-24 DIAGNOSIS — N39.0 URINARY TRACT INFECTION WITHOUT HEMATURIA, SITE UNSPECIFIED: ICD-10-CM

## 2024-07-24 DIAGNOSIS — R10.32 LEFT LOWER QUADRANT ABDOMINAL PAIN: ICD-10-CM

## 2024-07-24 DIAGNOSIS — K57.92 DIVERTICULITIS: ICD-10-CM

## 2024-07-24 DIAGNOSIS — R10.32 LEFT LOWER QUADRANT ABDOMINAL PAIN: Primary | ICD-10-CM

## 2024-07-24 LAB
ALBUMIN SERPL BCP-MCNC: 4.3 G/DL (ref 3.4–5)
ALP SERPL-CCNC: 70 U/L (ref 33–136)
ALT SERPL W P-5'-P-CCNC: 8 U/L (ref 10–52)
ANION GAP SERPL CALC-SCNC: 12 MMOL/L (ref 10–20)
APPEARANCE UR: CLEAR
AST SERPL W P-5'-P-CCNC: 18 U/L (ref 9–39)
BASOPHILS # BLD AUTO: 0.04 X10*3/UL (ref 0–0.1)
BASOPHILS NFR BLD AUTO: 0.5 %
BILIRUB SERPL-MCNC: 0.8 MG/DL (ref 0–1.2)
BILIRUB UR STRIP.AUTO-MCNC: NEGATIVE MG/DL
BUN SERPL-MCNC: 15 MG/DL (ref 6–23)
CALCIUM SERPL-MCNC: 9.1 MG/DL (ref 8.6–10.3)
CHLORIDE SERPL-SCNC: 105 MMOL/L (ref 98–107)
CO2 SERPL-SCNC: 25 MMOL/L (ref 21–32)
COLOR UR: YELLOW
CREAT SERPL-MCNC: 1.13 MG/DL (ref 0.5–1.3)
CRP SERPL-MCNC: 0.19 MG/DL
EGFRCR SERPLBLD CKD-EPI 2021: 64 ML/MIN/1.73M*2
EOSINOPHIL # BLD AUTO: 0.63 X10*3/UL (ref 0–0.4)
EOSINOPHIL NFR BLD AUTO: 7.6 %
ERYTHROCYTE [DISTWIDTH] IN BLOOD BY AUTOMATED COUNT: 13.2 % (ref 11.5–14.5)
ERYTHROCYTE [SEDIMENTATION RATE] IN BLOOD BY WESTERGREN METHOD: 17 MM/H (ref 0–20)
GLUCOSE SERPL-MCNC: 90 MG/DL (ref 74–99)
GLUCOSE UR STRIP.AUTO-MCNC: NORMAL MG/DL
HCT VFR BLD AUTO: 46.2 % (ref 41–52)
HGB BLD-MCNC: 15.2 G/DL (ref 13.5–17.5)
IMM GRANULOCYTES # BLD AUTO: 0.05 X10*3/UL (ref 0–0.5)
IMM GRANULOCYTES NFR BLD AUTO: 0.6 % (ref 0–0.9)
KETONES UR STRIP.AUTO-MCNC: NEGATIVE MG/DL
LEUKOCYTE ESTERASE UR QL STRIP.AUTO: NEGATIVE
LYMPHOCYTES # BLD AUTO: 1.99 X10*3/UL (ref 0.8–3)
LYMPHOCYTES NFR BLD AUTO: 24.1 %
MCH RBC QN AUTO: 29.2 PG (ref 26–34)
MCHC RBC AUTO-ENTMCNC: 32.9 G/DL (ref 32–36)
MCV RBC AUTO: 89 FL (ref 80–100)
MONOCYTES # BLD AUTO: 0.81 X10*3/UL (ref 0.05–0.8)
MONOCYTES NFR BLD AUTO: 9.8 %
NEUTROPHILS # BLD AUTO: 4.75 X10*3/UL (ref 1.6–5.5)
NEUTROPHILS NFR BLD AUTO: 57.4 %
NITRITE UR QL STRIP.AUTO: NEGATIVE
NRBC BLD-RTO: 0 /100 WBCS (ref 0–0)
PH UR STRIP.AUTO: 6 [PH]
PLATELET # BLD AUTO: 186 X10*3/UL (ref 150–450)
POTASSIUM SERPL-SCNC: 4.2 MMOL/L (ref 3.5–5.3)
PROT SERPL-MCNC: 6.9 G/DL (ref 6.4–8.2)
PROT UR STRIP.AUTO-MCNC: NEGATIVE MG/DL
RBC # BLD AUTO: 5.21 X10*6/UL (ref 4.5–5.9)
RBC # UR STRIP.AUTO: NEGATIVE /UL
SODIUM SERPL-SCNC: 138 MMOL/L (ref 136–145)
SP GR UR STRIP.AUTO: 1.02
UROBILINOGEN UR STRIP.AUTO-MCNC: NORMAL MG/DL
WBC # BLD AUTO: 8.3 X10*3/UL (ref 4.4–11.3)

## 2024-07-24 PROCEDURE — 85025 COMPLETE CBC W/AUTO DIFF WBC: CPT

## 2024-07-24 PROCEDURE — 99214 OFFICE O/P EST MOD 30 MIN: CPT | Performed by: FAMILY MEDICINE

## 2024-07-24 PROCEDURE — 86140 C-REACTIVE PROTEIN: CPT

## 2024-07-24 PROCEDURE — 1159F MED LIST DOCD IN RCRD: CPT | Performed by: FAMILY MEDICINE

## 2024-07-24 PROCEDURE — 87086 URINE CULTURE/COLONY COUNT: CPT

## 2024-07-24 PROCEDURE — 3074F SYST BP LT 130 MM HG: CPT | Performed by: FAMILY MEDICINE

## 2024-07-24 PROCEDURE — 1036F TOBACCO NON-USER: CPT | Performed by: FAMILY MEDICINE

## 2024-07-24 PROCEDURE — 85652 RBC SED RATE AUTOMATED: CPT

## 2024-07-24 PROCEDURE — 81003 URINALYSIS AUTO W/O SCOPE: CPT

## 2024-07-24 PROCEDURE — 3079F DIAST BP 80-89 MM HG: CPT | Performed by: FAMILY MEDICINE

## 2024-07-24 PROCEDURE — 36415 COLL VENOUS BLD VENIPUNCTURE: CPT

## 2024-07-24 PROCEDURE — 80053 COMPREHEN METABOLIC PANEL: CPT

## 2024-07-24 RX ORDER — CIPROFLOXACIN 500 MG/1
500 TABLET ORAL 2 TIMES DAILY
Qty: 14 TABLET | Refills: 0 | Status: SHIPPED | OUTPATIENT
Start: 2024-07-24 | End: 2024-07-31

## 2024-07-24 RX ORDER — TAMSULOSIN HYDROCHLORIDE 0.4 MG/1
0.4 CAPSULE ORAL NIGHTLY
COMMUNITY
Start: 2024-05-05

## 2024-07-24 RX ORDER — METRONIDAZOLE 500 MG/1
500 TABLET ORAL 2 TIMES DAILY
Qty: 14 TABLET | Refills: 0 | Status: SHIPPED | OUTPATIENT
Start: 2024-07-24 | End: 2024-07-31

## 2024-07-24 NOTE — PROGRESS NOTES
Subjective   Patient ID: Luis Armando Malin is a 84 y.o. male who presents for Abdominal Pain (Stomach, lower back pain /) and Follow-up (Needs handicap placard ).  HPI  Patient is having abdominal pain for couple of month , 2.5 months , the pain is more like discomfort , sometimes it is all day , sometimes is minimal , sharp pain every day , it is a sharp stabbing pain few seconds then goes away.   Patient taking mirlax , prune juice , has been able to go every day , somedays he is slightly constipated.   He changed GI 2 months ago , 3 months ago , Dr. Troncoso moved closer to [place], he made appointment Dr. Palomares in [place].   Tired , not feeling well.   He is old and he has parkinson.   Not seeing blood in the stools   He is eating well , only 1 meals a day   Lost 9 pounds since April   Not feeling nauseous.   He also has chronic hip joint and back pain that seems to be worse more recently .     Review of Systems    Past Medical History:   Diagnosis Date    Heart disease, unspecified     Heart problem    Personal history of other diseases of the nervous system and sense organs     History of Parkinson's disease    Personal history of other endocrine, nutritional and metabolic disease     History of high cholesterol       Past Surgical History:   Procedure Laterality Date    OTHER SURGICAL HISTORY  06/25/2020    Hernia repair    OTHER SURGICAL HISTORY  06/25/2020    Gallbladder surgery    OTHER SURGICAL HISTORY  06/25/2020    Shoulder surgery    OTHER SURGICAL HISTORY  06/25/2020    Sinus surgery    OTHER SURGICAL HISTORY  06/25/2020    Elbow surgery      Social History     Socioeconomic History    Marital status:    Tobacco Use    Smoking status: Former     Types: Cigarettes     Passive exposure: Past    Smokeless tobacco: Never   Substance and Sexual Activity    Alcohol use: Yes    Drug use: Never      Family History   Problem Relation Name Age of Onset    Cancer Father         MEDICATIONS AND  ALLERGIES:    ALLERGIES Atorvastatin, Clarithromycin, and Penicillins    MEDICATIONS   Current Outpatient Medications on File Prior to Visit   Medication Sig Dispense Refill    ascorbic acid, vitamin C, 500 mg capsule Take by mouth.      aspirin 81 mg EC tablet Take by mouth once daily.      carbidopa-levodopa (Sinemet)  mg tablet Take 1.5 tablets by mouth 4 times a day. 180 tablet 0    mirtazapine (Remeron) 15 mg tablet Take 1/2 Pill AT Bedtime For 2 Weeks Then Increase To A Full Pill AT Bedtime      multivitamin with minerals (Adults Multivitamin) tablet Take by mouth.      omega 3-dha-epa-fish oil (Fish OiL) 1,200 (144-216) mg capsule Take by mouth.      omeprazole (PriLOSEC) 40 mg DR capsule 1 BY MOUTH ONCE DAILY ( INCREASED FROM 20MG ON 6/9/23) 90 capsule 3    rosuvastatin (Crestor) 20 mg tablet Take by mouth once daily.      tamsulosin (Flomax) 0.4 mg 24 hr capsule Take 1 capsule (0.4 mg) by mouth once daily at bedtime.      zinc gluconate 50 mg tablet once every 24 hours.      cetirizine (ZyrTEC) 10 mg tablet Take 1 tablet (10 mg) by mouth once daily.      [DISCONTINUED] plecanatide (Trulance) tablet tablet 1 tablet (3 mg).      [DISCONTINUED] sennosides (Senokot) 8.6 mg tablet Take by mouth.       No current facility-administered medications on file prior to visit.              Objective   Visit Vitals  /80 (BP Location: Left arm, Patient Position: Sitting, BP Cuff Size: Adult)   Pulse 64   Temp 36.5 °C (97.7 °F) (Temporal)   Resp 16   Wt 77.1 kg (170 lb)   BMI 26.63 kg/m²   Smoking Status Former   BSA 1.91 m²          6/25/2020     3:42 PM 7/17/2020    11:07 AM 10/6/2023     1:01 PM 11/29/2023     9:51 AM 4/5/2024    10:59 AM 7/24/2024     1:04 PM   Vitals   Systolic 128 119 110 120 126 128   Diastolic 74 70 70 74 82 80   Heart Rate 50 61 60 62 70 64   Temp 36.2 °C (97.1 °F) 35.9 °C (96.7 °F) 36.3 °C (97.3 °F) 36.2 °C (97.1 °F) 36.7 °C (98 °F) 36.5 °C (97.7 °F)   Resp   16 16  16   Height (in)  "1.702 m (5' 7\") 1.702 m (5' 7\")   1.702 m (5' 7\")    Weight (lb) 175 175 171 180 179 170   BMI 27.41 kg/m2 27.41 kg/m2 26.78 kg/m2 28.19 kg/m2 28.04 kg/m2 26.63 kg/m2   BSA (m2) 1.94 m2 1.94 m2 1.92 m2 1.96 m2 1.96 m2 1.91 m2   Visit Report   Report Report Report Report     Physical Exam  Constitutional:       Appearance: Normal appearance. He is normal weight.   HENT:      Head: Normocephalic.      Right Ear: Tympanic membrane normal.      Left Ear: Tympanic membrane normal.      Nose: Nose normal.      Mouth/Throat:      Pharynx: Oropharynx is clear.   Eyes:      Pupils: Pupils are equal, round, and reactive to light.   Cardiovascular:      Rate and Rhythm: Normal rate and regular rhythm.      Pulses: Normal pulses.      Heart sounds: Normal heart sounds.   Pulmonary:      Effort: Pulmonary effort is normal.      Breath sounds: Normal breath sounds. No stridor. No rhonchi.   Abdominal:      General: Abdomen is flat. There is no distension.      Palpations: Abdomen is soft.      Tenderness: There is abdominal tenderness (left lower quadrant tendeness.).   Musculoskeletal:         General: No swelling or tenderness.   Skin:     Coloration: Skin is not jaundiced or pale.   Neurological:      General: No focal deficit present.      Mental Status: He is alert and oriented to person, place, and time. Mental status is at baseline.      Cranial Nerves: No cranial nerve deficit.      Sensory: No sensory deficit.      Motor: No weakness.      Coordination: Coordination normal.      Gait: Gait normal.      Deep Tendon Reflexes: Reflexes normal.   Psychiatric:         Mood and Affect: Mood normal.         Behavior: Behavior normal.         Thought Content: Thought content normal.         Judgment: Judgment normal.             Assessment & Plan  Left lower quadrant abdominal pain  With tendrenss on palpatoin   Concenring for diverticulitis   Will order the labs below   Will order CT scan   Will start patient on cipro flagyl , " side effects explained , aptient verbalized understanding.   Fmaily hx of aneurysm , however no aneurysm seen on imaging from 2022.   Orders:    CBC and Auto Differential; Future    Comprehensive metabolic panel; Future    C-reactive protein; Future    Sedimentation Rate; Future    Urinalysis with Reflex Microscopic; Future    Urine Culture; Future    CT abdomen pelvis w and wo IV contrast; Future    Diverticulitis  Will order CT scan   Alarming signs dicussed, contact us for any changes.   Orders:    CBC and Auto Differential; Future    Comprehensive metabolic panel; Future    C-reactive protein; Future    Sedimentation Rate; Future    Urinalysis with Reflex Microscopic; Future    Urine Culture; Future    CT abdomen pelvis w and wo IV contrast; Future    ciprofloxacin (Cipro) 500 mg tablet; Take 1 tablet (500 mg) by mouth 2 times a day for 7 days.    metroNIDAZOLE (Flagyl) 500 mg tablet; Take 1 tablet (500 mg) by mouth 2 times a day for 7 days.    Urinary tract infection without hematuria, site unspecified  Will send urine for analysis and culture.   Orders:    Urinalysis with Reflex Microscopic; Future    Urine Culture; Future    CT abdomen pelvis w and wo IV contrast; Future

## 2024-07-24 NOTE — LETTER
July 24, 2024     Patient: Luis Armando Malin   YOB: 1940   Date of Visit: 7/24/2024       To Whom It May Concern:    It is my medical opinion that Mr. 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  Duration of need: 5 years    If you have any questions or concerns, please don't hesitate to call.         Sincerely,        Jeff Mata MD        CC: No Recipients

## 2024-07-25 ENCOUNTER — TELEPHONE (OUTPATIENT)
Dept: PRIMARY CARE | Facility: CLINIC | Age: 84
End: 2024-07-25
Payer: COMMERCIAL

## 2024-07-25 LAB — BACTERIA UR CULT: NO GROWTH

## 2024-07-25 NOTE — TELEPHONE ENCOUNTER
Authorization is not required for cpt 65939 per Gerardo NEGRON at 9:56 am on 7/25/24. Message sent to Melany to priscilla.

## 2024-07-30 ENCOUNTER — TELEPHONE (OUTPATIENT)
Dept: PRIMARY CARE | Facility: CLINIC | Age: 84
End: 2024-07-30
Payer: COMMERCIAL

## 2024-07-30 NOTE — TELEPHONE ENCOUNTER
Patient is calling in stating that he is having a lot of diarrhea due to the Cipro asking if he can stop it.

## 2024-08-07 ENCOUNTER — APPOINTMENT (OUTPATIENT)
Dept: PRIMARY CARE | Facility: CLINIC | Age: 84
End: 2024-08-07
Payer: COMMERCIAL

## 2024-08-13 ENCOUNTER — HOSPITAL ENCOUNTER (OUTPATIENT)
Dept: RADIOLOGY | Facility: CLINIC | Age: 84
Discharge: HOME | End: 2024-08-13
Payer: COMMERCIAL

## 2024-08-13 DIAGNOSIS — R10.32 LEFT LOWER QUADRANT ABDOMINAL PAIN: ICD-10-CM

## 2024-08-13 DIAGNOSIS — K57.92 DIVERTICULITIS: ICD-10-CM

## 2024-08-13 DIAGNOSIS — N39.0 URINARY TRACT INFECTION WITHOUT HEMATURIA, SITE UNSPECIFIED: ICD-10-CM

## 2024-08-13 PROCEDURE — 74177 CT ABD & PELVIS W/CONTRAST: CPT

## 2024-08-13 PROCEDURE — 74177 CT ABD & PELVIS W/CONTRAST: CPT | Performed by: RADIOLOGY

## 2024-08-13 PROCEDURE — 2550000001 HC RX 255 CONTRASTS: Performed by: FAMILY MEDICINE

## 2024-08-15 ENCOUNTER — APPOINTMENT (OUTPATIENT)
Dept: PRIMARY CARE | Facility: CLINIC | Age: 84
End: 2024-08-15
Payer: COMMERCIAL

## 2024-08-15 VITALS
TEMPERATURE: 97.1 F | SYSTOLIC BLOOD PRESSURE: 112 MMHG | WEIGHT: 173 LBS | BODY MASS INDEX: 27.1 KG/M2 | DIASTOLIC BLOOD PRESSURE: 72 MMHG | HEART RATE: 60 BPM | RESPIRATION RATE: 16 BRPM

## 2024-08-15 DIAGNOSIS — R10.32 LEFT LOWER QUADRANT ABDOMINAL PAIN: Primary | ICD-10-CM

## 2024-08-15 PROCEDURE — 99213 OFFICE O/P EST LOW 20 MIN: CPT | Performed by: FAMILY MEDICINE

## 2024-08-15 PROCEDURE — 3074F SYST BP LT 130 MM HG: CPT | Performed by: FAMILY MEDICINE

## 2024-08-15 PROCEDURE — 3078F DIAST BP <80 MM HG: CPT | Performed by: FAMILY MEDICINE

## 2024-08-15 PROCEDURE — 1036F TOBACCO NON-USER: CPT | Performed by: FAMILY MEDICINE

## 2024-08-15 PROCEDURE — 1159F MED LIST DOCD IN RCRD: CPT | Performed by: FAMILY MEDICINE

## 2024-08-15 NOTE — PROGRESS NOTES
Subjective   Patient ID: Luis Armando Malin is a 84 y.o. male who presents for Follow-up (2 week fu on abd pain).  HPI  Pain much better after the antibiotics   He is taking prune juice and that is helping with BM     Patient is having abdominal pain for couple of month , 2.5 months , the pain is more like discomfort , sometimes it is all day , sometimes is minimal , sharp pain every day , it is a sharp stabbing pain few seconds then goes away.   Patient taking mirlax , prune juice , has been able to go every day , somedays he is slightly constipated.   He changed GI 2 months ago , 3 months ago , Dr. Troncoso moved closer to [place], he made appointment Dr. Palomares in [place].   Tired , not feeling well.   He is old and he has parkinson.   Not seeing blood in the stools   He is eating well , only 1 meals a day   Lost 9 pounds since April   Not feeling nauseous.   He also has chronic hip joint and back pain that seems to be worse more recently .   Review of Systems    Past Medical History:   Diagnosis Date    Heart disease, unspecified     Heart problem    Personal history of other diseases of the nervous system and sense organs     History of Parkinson's disease    Personal history of other endocrine, nutritional and metabolic disease     History of high cholesterol       Past Surgical History:   Procedure Laterality Date    OTHER SURGICAL HISTORY  06/25/2020    Hernia repair    OTHER SURGICAL HISTORY  06/25/2020    Gallbladder surgery    OTHER SURGICAL HISTORY  06/25/2020    Shoulder surgery    OTHER SURGICAL HISTORY  06/25/2020    Sinus surgery    OTHER SURGICAL HISTORY  06/25/2020    Elbow surgery      Social History     Socioeconomic History    Marital status:    Tobacco Use    Smoking status: Former     Types: Cigarettes     Passive exposure: Past    Smokeless tobacco: Never   Substance and Sexual Activity    Alcohol use: Yes    Drug use: Never      Family History   Problem Relation Name Age of Onset    Cancer  "Father         MEDICATIONS AND ALLERGIES:    ALLERGIES Atorvastatin, Clarithromycin, and Penicillins    MEDICATIONS   Current Outpatient Medications on File Prior to Visit   Medication Sig Dispense Refill    aspirin 81 mg EC tablet Take by mouth once daily.      carbidopa-levodopa (Sinemet)  mg tablet Take 1.5 tablets by mouth 4 times a day. 180 tablet 0    mirtazapine (Remeron) 15 mg tablet Take 1/2 Pill AT Bedtime For 2 Weeks Then Increase To A Full Pill AT Bedtime      omeprazole (PriLOSEC) 40 mg DR capsule 1 BY MOUTH ONCE DAILY ( INCREASED FROM 20MG ON 6/9/23) 90 capsule 3    rosuvastatin (Crestor) 20 mg tablet Take by mouth once daily.      tamsulosin (Flomax) 0.4 mg 24 hr capsule Take 1 capsule (0.4 mg) by mouth once daily at bedtime.      ascorbic acid, vitamin C, 500 mg capsule Take by mouth.      multivitamin with minerals (Adults Multivitamin) tablet Take by mouth.      omega 3-dha-epa-fish oil (Fish OiL) 1,200 (144-216) mg capsule Take by mouth.      zinc gluconate 50 mg tablet once every 24 hours.      [DISCONTINUED] cetirizine (ZyrTEC) 10 mg tablet Take 1 tablet (10 mg) by mouth once daily.       No current facility-administered medications on file prior to visit.              Objective   Visit Vitals  /72 (BP Location: Left arm, Patient Position: Sitting, BP Cuff Size: Large adult)   Pulse 60   Temp 36.2 °C (97.1 °F) (Temporal)   Resp 16   Wt 78.5 kg (173 lb)   BMI 27.10 kg/m²   Smoking Status Former   BSA 1.93 m²          6/25/2020     3:42 PM 7/17/2020    11:07 AM 10/6/2023     1:01 PM 11/29/2023     9:51 AM 4/5/2024    10:59 AM 7/24/2024     1:04 PM 8/15/2024    10:45 AM   Vitals   Systolic 128 119 110 120 126 128 112   Diastolic 74 70 70 74 82 80 72   Heart Rate 50 61 60 62 70 64 60   Temp 36.2 °C (97.1 °F) 35.9 °C (96.7 °F) 36.3 °C (97.3 °F) 36.2 °C (97.1 °F) 36.7 °C (98 °F) 36.5 °C (97.7 °F) 36.2 °C (97.1 °F)   Resp   16 16  16 16   Height (in) 1.702 m (5' 7\") 1.702 m (5' 7\")   1.702 " "m (5' 7\")     Weight (lb) 175 175 171 180 179 170 173   BMI 27.41 kg/m2 27.41 kg/m2 26.78 kg/m2 28.19 kg/m2 28.04 kg/m2 26.63 kg/m2 27.1 kg/m2   BSA (m2) 1.94 m2 1.94 m2 1.92 m2 1.96 m2 1.96 m2 1.91 m2 1.93 m2   Visit Report   Report Report Report Report Report     Physical Exam  Bloated , but soft.       Assessment & Plan  Left lower quadrant abdominal pain  Improved   CT scan done , reading pedning   Clinically doing much better.                 "

## 2024-08-20 ENCOUNTER — TELEPHONE (OUTPATIENT)
Dept: PRIMARY CARE | Facility: CLINIC | Age: 84
End: 2024-08-20
Payer: COMMERCIAL

## 2024-08-23 ENCOUNTER — TELEPHONE (OUTPATIENT)
Dept: PRIMARY CARE | Facility: CLINIC | Age: 84
End: 2024-08-23
Payer: COMMERCIAL

## 2024-08-23 NOTE — TELEPHONE ENCOUNTER
----- Message from Jeff Mata sent at 8/22/2024  5:32 PM EDT -----  It is showing pocketing in the colon without infection ( it could have been treated with the antibiotics)   It also showed lymph node in the abdomen , which could be due to the infection that he had and was treated with antibiotics, however we need to repeat a CT scan in 3 weeks to make sure it got better.   He also have an aneurysm ( bulging in the artery ) of the abdominal aorta , does he remember if he has seen specialist for that ?   Lets schedule a virtual visit to go over the result .

## 2024-08-28 ENCOUNTER — APPOINTMENT (OUTPATIENT)
Dept: PRIMARY CARE | Facility: CLINIC | Age: 84
End: 2024-08-28
Payer: COMMERCIAL

## 2024-08-28 VITALS
BODY MASS INDEX: 27.1 KG/M2 | SYSTOLIC BLOOD PRESSURE: 110 MMHG | TEMPERATURE: 97.8 F | HEIGHT: 67 IN | HEART RATE: 56 BPM | DIASTOLIC BLOOD PRESSURE: 70 MMHG | OXYGEN SATURATION: 93 %

## 2024-08-28 DIAGNOSIS — R59.0 ABDOMINAL LYMPHADENOPATHY: Primary | ICD-10-CM

## 2024-08-28 DIAGNOSIS — I71.40 ABDOMINAL AORTIC ANEURYSM (AAA) WITHOUT RUPTURE, UNSPECIFIED PART (CMS-HCC): ICD-10-CM

## 2024-08-28 PROCEDURE — 3078F DIAST BP <80 MM HG: CPT | Performed by: FAMILY MEDICINE

## 2024-08-28 PROCEDURE — 99214 OFFICE O/P EST MOD 30 MIN: CPT | Performed by: FAMILY MEDICINE

## 2024-08-28 PROCEDURE — 1036F TOBACCO NON-USER: CPT | Performed by: FAMILY MEDICINE

## 2024-08-28 PROCEDURE — 3074F SYST BP LT 130 MM HG: CPT | Performed by: FAMILY MEDICINE

## 2024-08-28 PROCEDURE — 1159F MED LIST DOCD IN RCRD: CPT | Performed by: FAMILY MEDICINE

## 2024-08-28 NOTE — PROGRESS NOTES
Subjective   Patient ID: Luis Armando Malin is a 84 y.o. male who presents for Follow-up (Follow up on ct scan ).  HPI  CT scans showed abdominal lymphadenopathy, and aortic aneurysm  1. Diverticulosis without evidence of diverticulitis.  2. Very minute nonobstructing right renal calculus.  3. Severe coronary artery calcification.  4. 1.5 cm focal bulge/aneurysm of the right infrarenal aorta.  5. In addition to a minimally enlarged stable mesenteric node, there  is a new small right mesenteric density for which a follow-up CT is    His abdominal pain has improved  Review of Systems    Past Medical History:   Diagnosis Date    Heart disease, unspecified     Heart problem    Personal history of other diseases of the nervous system and sense organs     History of Parkinson's disease    Personal history of other endocrine, nutritional and metabolic disease     History of high cholesterol       Past Surgical History:   Procedure Laterality Date    OTHER SURGICAL HISTORY  06/25/2020    Hernia repair    OTHER SURGICAL HISTORY  06/25/2020    Gallbladder surgery    OTHER SURGICAL HISTORY  06/25/2020    Shoulder surgery    OTHER SURGICAL HISTORY  06/25/2020    Sinus surgery    OTHER SURGICAL HISTORY  06/25/2020    Elbow surgery      Social History     Socioeconomic History    Marital status:    Tobacco Use    Smoking status: Former     Types: Cigarettes     Passive exposure: Past    Smokeless tobacco: Never   Substance and Sexual Activity    Alcohol use: Yes    Drug use: Never      Family History   Problem Relation Name Age of Onset    Cancer Father         MEDICATIONS AND ALLERGIES:    ALLERGIES Atorvastatin, Clarithromycin, and Penicillins    MEDICATIONS   Current Outpatient Medications on File Prior to Visit   Medication Sig Dispense Refill    ascorbic acid, vitamin C, 500 mg capsule Take by mouth.      aspirin 81 mg EC tablet Take by mouth once daily.      carbidopa-levodopa (Sinemet)  mg tablet Take 1.5 tablets  "by mouth 4 times a day. 180 tablet 0    mirtazapine (Remeron) 15 mg tablet Take 1/2 Pill AT Bedtime For 2 Weeks Then Increase To A Full Pill AT Bedtime      multivitamin with minerals (Adults Multivitamin) tablet Take by mouth.      omega 3-dha-epa-fish oil (Fish OiL) 1,200 (144-216) mg capsule Take by mouth.      omeprazole (PriLOSEC) 40 mg DR capsule 1 BY MOUTH ONCE DAILY ( INCREASED FROM 20MG ON 6/9/23) 90 capsule 3    rosuvastatin (Crestor) 20 mg tablet Take by mouth once daily.      tamsulosin (Flomax) 0.4 mg 24 hr capsule Take 1 capsule (0.4 mg) by mouth once daily at bedtime.      zinc gluconate 50 mg tablet once every 24 hours.       No current facility-administered medications on file prior to visit.              Objective   Visit Vitals  /70   Pulse 56   Temp 36.6 °C (97.8 °F)   Ht 1.702 m (5' 7\")   SpO2 93%   BMI 27.10 kg/m²   Smoking Status Former   BSA 1.93 m²          7/17/2020    11:07 AM 10/6/2023     1:01 PM 11/29/2023     9:51 AM 4/5/2024    10:59 AM 7/24/2024     1:04 PM 8/15/2024    10:45 AM 8/28/2024    12:58 PM   Vitals   Systolic 119 110 120 126 128 112 110   Diastolic 70 70 74 82 80 72 70   Heart Rate 61 60 62 70 64 60 56   Temp 35.9 °C (96.7 °F) 36.3 °C (97.3 °F) 36.2 °C (97.1 °F) 36.7 °C (98 °F) 36.5 °C (97.7 °F) 36.2 °C (97.1 °F) 36.6 °C (97.8 °F)   Resp  16 16  16 16    Height (in) 1.702 m (5' 7\")   1.702 m (5' 7\")   1.702 m (5' 7\")   Weight (lb) 175 171 180 179 170 173    BMI 27.41 kg/m2 26.78 kg/m2 28.19 kg/m2 28.04 kg/m2 26.63 kg/m2 27.1 kg/m2 27.1 kg/m2   BSA (m2) 1.94 m2 1.92 m2 1.96 m2 1.96 m2 1.91 m2 1.93 m2 1.93 m2   Visit Report  Report Report Report Report Report Report     Physical Exam    Constitutional: awake; alert, interactive; in no acute distress; well nourished and well developed  ENT: ears and nose were normal in appearance;  Eyes: pupils equal and round  Pulmonary: no respiratory distress and normal respiratory rhythm and effort  Skin: normal skin color and " pigmentation;  Psychiatric: oriented to person, place, and time; affect was normal and the mood was normal       Assessment & Plan  Abdominal lymphadenopathy  We will repeat CT scan to be done in 3 months  Orders:    CT abdomen pelvis w and wo IV contrast; Future    Referral to Vascular Surgery; Future    Abdominal aortic aneurysm (AAA) without rupture, unspecified part (CMS-HCC)  Discussed finding with the patient, his brother had a ruptured aneurysm and he prefers to follow-up with vascular surgery, will put referral in chart, he is asking for establish care with someone with kidney clinic [facility].  He is going to inquire about recommendation with his cardiologist and get back to us.  Orders:    Referral to Vascular Surgery; Future

## 2024-09-03 ENCOUNTER — TELEPHONE (OUTPATIENT)
Dept: PRIMARY CARE | Facility: CLINIC | Age: 84
End: 2024-09-03
Payer: COMMERCIAL

## 2024-10-04 ENCOUNTER — APPOINTMENT (OUTPATIENT)
Dept: PRIMARY CARE | Facility: CLINIC | Age: 84
End: 2024-10-04
Payer: COMMERCIAL

## 2024-10-04 VITALS
OXYGEN SATURATION: 96 % | HEIGHT: 67 IN | WEIGHT: 173 LBS | HEART RATE: 58 BPM | BODY MASS INDEX: 27.15 KG/M2 | SYSTOLIC BLOOD PRESSURE: 114 MMHG | DIASTOLIC BLOOD PRESSURE: 70 MMHG | TEMPERATURE: 97.6 F

## 2024-10-04 DIAGNOSIS — R19.00 ABDOMINAL MASS, UNSPECIFIED ABDOMINAL LOCATION: Primary | ICD-10-CM

## 2024-10-04 DIAGNOSIS — I71.40 ABDOMINAL AORTIC ANEURYSM (AAA) WITHOUT RUPTURE, UNSPECIFIED PART (CMS-HCC): ICD-10-CM

## 2024-10-04 PROCEDURE — 3078F DIAST BP <80 MM HG: CPT | Performed by: FAMILY MEDICINE

## 2024-10-04 PROCEDURE — 99213 OFFICE O/P EST LOW 20 MIN: CPT | Performed by: FAMILY MEDICINE

## 2024-10-04 PROCEDURE — 3074F SYST BP LT 130 MM HG: CPT | Performed by: FAMILY MEDICINE

## 2024-10-04 PROCEDURE — 1159F MED LIST DOCD IN RCRD: CPT | Performed by: FAMILY MEDICINE

## 2024-10-04 PROCEDURE — 1036F TOBACCO NON-USER: CPT | Performed by: FAMILY MEDICINE

## 2024-10-04 NOTE — PROGRESS NOTES
Subjective   Patient ID: Luis Armando Malin is a 84 y.o. male who presents for Follow-up (Follow up on ct scan ).  HPI  Dull pain , on the left lower quadrant , throbs for 1-2 hours.   Bowel movements are ok , since he started sillium with miralax, 16 ounces of prune juice with sugar. Once every 3 weeks fleet.     Scheduled for endoscopy with Dr. Tierney.       CT scan done showing aneurysm for which he followed with vascular surgeon and they recommended yearly follow up .     It also showed lymph nodes and mnesentric changes , recommendation was for repeat imaging .         Review of Systems    Past Medical History:   Diagnosis Date    Heart disease, unspecified     Heart problem    Personal history of other diseases of the nervous system and sense organs     History of Parkinson's disease    Personal history of other endocrine, nutritional and metabolic disease     History of high cholesterol       Past Surgical History:   Procedure Laterality Date    OTHER SURGICAL HISTORY  06/25/2020    Hernia repair    OTHER SURGICAL HISTORY  06/25/2020    Gallbladder surgery    OTHER SURGICAL HISTORY  06/25/2020    Shoulder surgery    OTHER SURGICAL HISTORY  06/25/2020    Sinus surgery    OTHER SURGICAL HISTORY  06/25/2020    Elbow surgery      Social History     Socioeconomic History    Marital status:    Tobacco Use    Smoking status: Former     Types: Cigarettes     Passive exposure: Past    Smokeless tobacco: Never   Substance and Sexual Activity    Alcohol use: Yes    Drug use: Never      Family History   Problem Relation Name Age of Onset    Cancer Father         MEDICATIONS AND ALLERGIES:    ALLERGIES Atorvastatin, Clarithromycin, and Penicillins    MEDICATIONS   Current Outpatient Medications on File Prior to Visit   Medication Sig Dispense Refill    ascorbic acid, vitamin C, 500 mg capsule Take by mouth.      aspirin 81 mg EC tablet Take by mouth once daily.      carbidopa-levodopa (Sinemet)  mg tablet Take 1.5  "tablets by mouth 4 times a day. 180 tablet 0    mirtazapine (Remeron) 15 mg tablet Take 1/2 Pill AT Bedtime For 2 Weeks Then Increase To A Full Pill AT Bedtime      multivitamin with minerals (Adults Multivitamin) tablet Take by mouth.      omega 3-dha-epa-fish oil (Fish OiL) 1,200 (144-216) mg capsule Take by mouth.      omeprazole (PriLOSEC) 40 mg DR capsule 1 BY MOUTH ONCE DAILY ( INCREASED FROM 20MG ON 6/9/23) 90 capsule 3    rosuvastatin (Crestor) 20 mg tablet Take by mouth once daily.      tamsulosin (Flomax) 0.4 mg 24 hr capsule Take 1 capsule (0.4 mg) by mouth once daily at bedtime.      zinc gluconate 50 mg tablet once every 24 hours.       No current facility-administered medications on file prior to visit.              Objective   Visit Vitals  /70   Pulse 58   Temp 36.4 °C (97.6 °F)   Ht 1.702 m (5' 7\")   Wt 78.5 kg (173 lb)   SpO2 96%   BMI 27.10 kg/m²   Smoking Status Former   BSA 1.93 m²          10/6/2023     1:01 PM 11/29/2023     9:51 AM 4/5/2024    10:59 AM 7/24/2024     1:04 PM 8/15/2024    10:45 AM 8/28/2024    12:58 PM 10/4/2024    12:56 PM   Vitals   Systolic 110 120 126 128 112 110 114   Diastolic 70 74 82 80 72 70 70   Heart Rate 60 62 70 64 60 56 58   Temp 36.3 °C (97.3 °F) 36.2 °C (97.1 °F) 36.7 °C (98 °F) 36.5 °C (97.7 °F) 36.2 °C (97.1 °F) 36.6 °C (97.8 °F) 36.4 °C (97.6 °F)   Resp 16 16  16 16     Height (in)   1.702 m (5' 7\")   1.702 m (5' 7\") 1.702 m (5' 7\")   Weight (lb) 171 180 179 170 173  173   BMI 26.78 kg/m2 28.19 kg/m2 28.04 kg/m2 26.63 kg/m2 27.1 kg/m2 27.1 kg/m2 27.1 kg/m2   BSA (m2) 1.92 m2 1.96 m2 1.96 m2 1.91 m2 1.93 m2 1.93 m2 1.93 m2   Visit Report Report Report Report Report Report Report Report     Physical Exam        Assessment & Plan  Abdominal mass, unspecified abdominal location  Will repeat imaging in 3 months from the first  Scehduled for endoscopy   Continue treatment for constipation.   Orders:    CT abdomen pelvis w and wo IV contrast; " Future    Abdominal aortic aneurysm (AAA) without rupture, unspecified part (CMS-HCC)  Small scaular aneursym   He saw vascular surgeon , recommednation to repeat CTA in1  year.

## 2024-11-12 ENCOUNTER — TELEMEDICINE (OUTPATIENT)
Dept: PRIMARY CARE | Facility: CLINIC | Age: 84
End: 2024-11-12
Payer: COMMERCIAL

## 2024-11-12 DIAGNOSIS — M79.605 LEFT LEG PAIN: Primary | ICD-10-CM

## 2024-11-12 PROCEDURE — 99441 PR PHYS/QHP TELEPHONE EVALUATION 5-10 MIN: CPT | Performed by: FAMILY MEDICINE

## 2024-11-13 NOTE — PROGRESS NOTES
Subjective   Patient ID: Luis Armando Malin is a 84 y.o. male who presents for left leg pain   HPI  Left leg pain and swelling   Hx of R leg dvt  Review of Systems    Past Medical History:   Diagnosis Date    Heart disease, unspecified     Heart problem    Personal history of other diseases of the nervous system and sense organs     History of Parkinson's disease    Personal history of other endocrine, nutritional and metabolic disease     History of high cholesterol       Past Surgical History:   Procedure Laterality Date    OTHER SURGICAL HISTORY  06/25/2020    Hernia repair    OTHER SURGICAL HISTORY  06/25/2020    Gallbladder surgery    OTHER SURGICAL HISTORY  06/25/2020    Shoulder surgery    OTHER SURGICAL HISTORY  06/25/2020    Sinus surgery    OTHER SURGICAL HISTORY  06/25/2020    Elbow surgery      Social History     Socioeconomic History    Marital status:    Tobacco Use    Smoking status: Former     Types: Cigarettes     Passive exposure: Past    Smokeless tobacco: Never   Substance and Sexual Activity    Alcohol use: Yes    Drug use: Never      Family History   Problem Relation Name Age of Onset    Cancer Father         MEDICATIONS AND ALLERGIES:    ALLERGIES Atorvastatin, Clarithromycin, and Penicillins    MEDICATIONS   Current Outpatient Medications on File Prior to Visit   Medication Sig Dispense Refill    ascorbic acid, vitamin C, 500 mg capsule Take by mouth.      aspirin 81 mg EC tablet Take by mouth once daily.      carbidopa-levodopa (Sinemet)  mg tablet Take 1.5 tablets by mouth 4 times a day. 180 tablet 0    mirtazapine (Remeron) 15 mg tablet Take 1/2 Pill AT Bedtime For 2 Weeks Then Increase To A Full Pill AT Bedtime      multivitamin with minerals (Adults Multivitamin) tablet Take by mouth.      omega 3-dha-epa-fish oil (Fish OiL) 1,200 (144-216) mg capsule Take by mouth.      omeprazole (PriLOSEC) 40 mg DR capsule 1 BY MOUTH ONCE DAILY ( INCREASED FROM 20MG ON 6/9/23) 90 capsule 3     "rosuvastatin (Crestor) 20 mg tablet Take by mouth once daily.      tamsulosin (Flomax) 0.4 mg 24 hr capsule Take 1 capsule (0.4 mg) by mouth once daily at bedtime.      zinc gluconate 50 mg tablet once every 24 hours.       No current facility-administered medications on file prior to visit.              Objective   Visit Vitals  Smoking Status Former          10/6/2023     1:01 PM 11/29/2023     9:51 AM 4/5/2024    10:59 AM 7/24/2024     1:04 PM 8/15/2024    10:45 AM 8/28/2024    12:58 PM 10/4/2024    12:56 PM   Vitals   Systolic 110 120 126 128 112 110 114   Diastolic 70 74 82 80 72 70 70   Heart Rate 60 62 70 64 60 56 58   Temp 36.3 °C (97.3 °F) 36.2 °C (97.1 °F) 36.7 °C (98 °F) 36.5 °C (97.7 °F) 36.2 °C (97.1 °F) 36.6 °C (97.8 °F) 36.4 °C (97.6 °F)   Resp 16 16  16 16     Height (in)   1.702 m (5' 7\")   1.702 m (5' 7\") 1.702 m (5' 7\")   Weight (lb) 171 180 179 170 173  173   BMI 26.78 kg/m2 28.19 kg/m2 28.04 kg/m2 26.63 kg/m2 27.1 kg/m2 27.1 kg/m2 27.1 kg/m2   BSA (m2) 1.92 m2 1.96 m2 1.96 m2 1.91 m2 1.93 m2 1.93 m2 1.93 m2   Visit Report Report Report Report Report Report Report Report     Physical Exam    Patient visit completed via telephone visit. I spent 10 minutes on the phone with the patient and instructed them to contact me with any questions or new concerns.     Assessment & Plan  Left leg pain  Will order stat US   To rule out dvt   Alarming signs diucssed, contact us if you hae any   Orders:    Vascular US Lower Extremity Venous Duplex Left; Future             "

## 2024-11-19 ENCOUNTER — TELEMEDICINE (OUTPATIENT)
Dept: PRIMARY CARE | Facility: CLINIC | Age: 84
End: 2024-11-19
Payer: COMMERCIAL

## 2024-11-19 ENCOUNTER — APPOINTMENT (OUTPATIENT)
Dept: PRIMARY CARE | Facility: CLINIC | Age: 84
End: 2024-11-19
Payer: COMMERCIAL

## 2024-11-19 ENCOUNTER — HOSPITAL ENCOUNTER (OUTPATIENT)
Dept: RADIOLOGY | Facility: CLINIC | Age: 84
Discharge: HOME | End: 2024-11-19
Payer: COMMERCIAL

## 2024-11-19 DIAGNOSIS — J06.9 UPPER RESPIRATORY TRACT INFECTION, UNSPECIFIED TYPE: Primary | ICD-10-CM

## 2024-11-19 DIAGNOSIS — R19.00 ABDOMINAL MASS, UNSPECIFIED ABDOMINAL LOCATION: ICD-10-CM

## 2024-11-19 DIAGNOSIS — J06.9 UPPER RESPIRATORY TRACT INFECTION, UNSPECIFIED TYPE: ICD-10-CM

## 2024-11-19 LAB
CREAT SERPL-MCNC: 1.2 MG/DL (ref 0.6–1.3)
GFR SERPL CREATININE-BSD FRML MDRD: 59 ML/MIN/1.73M*2
POC BINAX EXPIRATION: NORMAL
POC BINAX NOW COVID SERIAL NUMBER: NORMAL
POC SARS-COV-2 AG BINAX: NORMAL

## 2024-11-19 PROCEDURE — 99442 PR PHYS/QHP TELEPHONE EVALUATION 11-20 MIN: CPT | Performed by: FAMILY MEDICINE

## 2024-11-19 PROCEDURE — 74177 CT ABD & PELVIS W/CONTRAST: CPT | Performed by: RADIOLOGY

## 2024-11-19 PROCEDURE — 82565 ASSAY OF CREATININE: CPT

## 2024-11-19 PROCEDURE — 2550000001 HC RX 255 CONTRASTS: Performed by: FAMILY MEDICINE

## 2024-11-19 PROCEDURE — 74177 CT ABD & PELVIS W/CONTRAST: CPT

## 2024-11-19 RX ORDER — DOXYCYCLINE 100 MG/1
100 CAPSULE ORAL 2 TIMES DAILY
Qty: 14 CAPSULE | Refills: 0 | Status: SHIPPED | OUTPATIENT
Start: 2024-11-19 | End: 2024-11-26

## 2024-11-19 NOTE — PROGRESS NOTES
Virtual or Telephone Consent    A telephone visit (audio only) between the patient (at the originating site) and the provider (at the distant site) was utilized to provide this telehealth service.   Verbal consent was requested and obtained from Luis Armando Malin on this date, 11/19/24 for a telehealth visit.     Subjective   Patient ID: Luis Armando Malin is a 84 y.o. male who presents for upper respiratory infection   HPI  Patient is congested , started 4 days ago   Started a dry cough   Then he is coughing yellow mucous.   Did not do a COIVD19   Not short of breath , no fever,     Review of Systems    Past Medical History:   Diagnosis Date    Heart disease, unspecified     Heart problem    Personal history of other diseases of the nervous system and sense organs     History of Parkinson's disease    Personal history of other endocrine, nutritional and metabolic disease     History of high cholesterol       Past Surgical History:   Procedure Laterality Date    OTHER SURGICAL HISTORY  06/25/2020    Hernia repair    OTHER SURGICAL HISTORY  06/25/2020    Gallbladder surgery    OTHER SURGICAL HISTORY  06/25/2020    Shoulder surgery    OTHER SURGICAL HISTORY  06/25/2020    Sinus surgery    OTHER SURGICAL HISTORY  06/25/2020    Elbow surgery      Social History     Socioeconomic History    Marital status:    Tobacco Use    Smoking status: Former     Types: Cigarettes     Passive exposure: Past    Smokeless tobacco: Never   Substance and Sexual Activity    Alcohol use: Yes    Drug use: Never      Family History   Problem Relation Name Age of Onset    Cancer Father         MEDICATIONS AND ALLERGIES:    ALLERGIES Atorvastatin, Clarithromycin, and Penicillins    MEDICATIONS   Current Outpatient Medications on File Prior to Visit   Medication Sig Dispense Refill    ascorbic acid, vitamin C, 500 mg capsule Take by mouth.      aspirin 81 mg EC tablet Take by mouth once daily.      carbidopa-levodopa (Sinemet)  mg tablet  "Take 1.5 tablets by mouth 4 times a day. 180 tablet 0    mirtazapine (Remeron) 15 mg tablet Take 1/2 Pill AT Bedtime For 2 Weeks Then Increase To A Full Pill AT Bedtime      multivitamin with minerals (Adults Multivitamin) tablet Take by mouth.      omega 3-dha-epa-fish oil (Fish OiL) 1,200 (144-216) mg capsule Take by mouth.      omeprazole (PriLOSEC) 40 mg DR capsule 1 BY MOUTH ONCE DAILY ( INCREASED FROM 20MG ON 6/9/23) 90 capsule 3    rosuvastatin (Crestor) 20 mg tablet Take by mouth once daily.      tamsulosin (Flomax) 0.4 mg 24 hr capsule Take 1 capsule (0.4 mg) by mouth once daily at bedtime.      zinc gluconate 50 mg tablet once every 24 hours.       No current facility-administered medications on file prior to visit.              Objective   Visit Vitals  Smoking Status Former          10/6/2023     1:01 PM 11/29/2023     9:51 AM 4/5/2024    10:59 AM 7/24/2024     1:04 PM 8/15/2024    10:45 AM 8/28/2024    12:58 PM 10/4/2024    12:56 PM   Vitals   Systolic 110 120 126 128 112 110 114   Diastolic 70 74 82 80 72 70 70   Heart Rate 60 62 70 64 60 56 58   Temp 36.3 °C (97.3 °F) 36.2 °C (97.1 °F) 36.7 °C (98 °F) 36.5 °C (97.7 °F) 36.2 °C (97.1 °F) 36.6 °C (97.8 °F) 36.4 °C (97.6 °F)   Resp 16 16  16 16     Height (in)   1.702 m (5' 7\")   1.702 m (5' 7\") 1.702 m (5' 7\")   Weight (lb) 171 180 179 170 173  173   BMI 26.78 kg/m2 28.19 kg/m2 28.04 kg/m2 26.63 kg/m2 27.1 kg/m2 27.1 kg/m2 27.1 kg/m2   BSA (m2) 1.92 m2 1.96 m2 1.96 m2 1.91 m2 1.93 m2 1.93 m2 1.93 m2   Visit Report Report Report Report Report Report Report Report     Physical Exam    Awake, responsive and oriented   Not in acute distress.     Assessment & Plan  Upper respiratory tract infection, unspecified type  Will test for COIVD19   Treat with doxy if negative   Alarming signs   Instructed pt to contact clinic is symptoms fail to improve or to return to clinic earlier if symptoms worsen   Counseled pt on warning signs of when to present to ER, they " verbalized understanding.   I explained the risk of antibiotics that could include diarrhea, development or resistant strains , Cdiff infection , profuse diarrhea , other opportunistic infection explained , possible allergies and she was advised to review the medication leaflet before she starts the treatment , she verbalized understanding and consented on starting the treatment.     Orders:    doxycycline (Vibramycin) 100 mg capsule; Take 1 capsule (100 mg) by mouth 2 times a day for 7 days. Take with at least 8 ounces (large glass) of water, do not lie down for 30 minutes after    POCT BinaxNOW Covid-19 Ag Card manually resulted

## 2024-11-20 ENCOUNTER — APPOINTMENT (OUTPATIENT)
Dept: PRIMARY CARE | Facility: CLINIC | Age: 84
End: 2024-11-20
Payer: COMMERCIAL

## 2024-11-20 ENCOUNTER — TELEPHONE (OUTPATIENT)
Dept: PRIMARY CARE | Facility: CLINIC | Age: 84
End: 2024-11-20

## 2024-11-20 NOTE — TELEPHONE ENCOUNTER
CT scan dept called re: order for CT ABD.    States pt had CT ABD done 9/2024 at [facility], had varinder CT ABD done yesterday, 11/19/24 at  and pt is on their schedule for a 3rd CT ABD 11/29/24.    Calling to make sure this is correct, if pt in fact needs another CT so soon 11/29 or if he should wait a couple months for recheck?    Please advise.    [phone]  CT dept   no jaundice present

## 2024-11-21 NOTE — TELEPHONE ENCOUNTER
Mag at ct dept notified to cancel 11/29 appt.    Informed patient of CT cancellation on 11/29 via voicemail.

## 2024-11-22 ENCOUNTER — TELEPHONE (OUTPATIENT)
Dept: PRIMARY CARE | Facility: CLINIC | Age: 84
End: 2024-11-22
Payer: COMMERCIAL

## 2024-11-27 ENCOUNTER — HOSPITAL ENCOUNTER (OUTPATIENT)
Dept: RADIOLOGY | Facility: CLINIC | Age: 84
Discharge: HOME | End: 2024-11-27
Payer: COMMERCIAL

## 2024-11-27 ENCOUNTER — APPOINTMENT (OUTPATIENT)
Dept: PRIMARY CARE | Facility: CLINIC | Age: 84
End: 2024-11-27
Payer: COMMERCIAL

## 2024-11-27 VITALS
HEIGHT: 67 IN | OXYGEN SATURATION: 92 % | DIASTOLIC BLOOD PRESSURE: 67 MMHG | BODY MASS INDEX: 27.31 KG/M2 | WEIGHT: 174 LBS | SYSTOLIC BLOOD PRESSURE: 135 MMHG | HEART RATE: 57 BPM | TEMPERATURE: 97.8 F

## 2024-11-27 DIAGNOSIS — K57.92 DIVERTICULITIS: ICD-10-CM

## 2024-11-27 DIAGNOSIS — J06.9 UPPER RESPIRATORY TRACT INFECTION, UNSPECIFIED TYPE: Primary | ICD-10-CM

## 2024-11-27 DIAGNOSIS — G20.B1 PARKINSON'S DISEASE WITH DYSKINESIA WITHOUT FLUCTUATING MANIFESTATIONS: ICD-10-CM

## 2024-11-27 DIAGNOSIS — J06.9 UPPER RESPIRATORY TRACT INFECTION, UNSPECIFIED TYPE: ICD-10-CM

## 2024-11-27 DIAGNOSIS — R19.00 ABDOMINAL MASS, UNSPECIFIED ABDOMINAL LOCATION: ICD-10-CM

## 2024-11-27 PROCEDURE — 71046 X-RAY EXAM CHEST 2 VIEWS: CPT

## 2024-11-27 PROCEDURE — 1159F MED LIST DOCD IN RCRD: CPT | Performed by: FAMILY MEDICINE

## 2024-11-27 PROCEDURE — 1036F TOBACCO NON-USER: CPT | Performed by: FAMILY MEDICINE

## 2024-11-27 PROCEDURE — 71046 X-RAY EXAM CHEST 2 VIEWS: CPT | Performed by: RADIOLOGY

## 2024-11-27 PROCEDURE — 3078F DIAST BP <80 MM HG: CPT | Performed by: FAMILY MEDICINE

## 2024-11-27 PROCEDURE — G2211 COMPLEX E/M VISIT ADD ON: HCPCS | Performed by: FAMILY MEDICINE

## 2024-11-27 PROCEDURE — 99214 OFFICE O/P EST MOD 30 MIN: CPT | Performed by: FAMILY MEDICINE

## 2024-11-27 PROCEDURE — 3075F SYST BP GE 130 - 139MM HG: CPT | Performed by: FAMILY MEDICINE

## 2024-11-27 RX ORDER — ALBUTEROL SULFATE 90 UG/1
2 INHALANT RESPIRATORY (INHALATION) EVERY 4 HOURS PRN
Qty: 8.5 G | Refills: 0 | Status: SHIPPED | OUTPATIENT
Start: 2024-11-27 | End: 2025-11-27

## 2024-11-27 RX ORDER — PREDNISONE 20 MG/1
40 TABLET ORAL DAILY
Qty: 10 TABLET | Refills: 0 | Status: SHIPPED | OUTPATIENT
Start: 2024-11-27 | End: 2024-12-02

## 2024-11-27 RX ORDER — BENZONATATE 100 MG/1
100 CAPSULE ORAL 3 TIMES DAILY PRN
Qty: 42 CAPSULE | Refills: 0 | Status: SHIPPED | OUTPATIENT
Start: 2024-11-27 | End: 2024-12-27

## 2024-11-27 RX ORDER — LEVOFLOXACIN 500 MG/1
500 TABLET, FILM COATED ORAL DAILY
Qty: 7 TABLET | Refills: 0 | Status: SHIPPED | OUTPATIENT
Start: 2024-11-27 | End: 2024-12-04

## 2024-11-27 NOTE — PROGRESS NOTES
Subjective   Patient ID: Luis Armando Malin is a 84 y.o. male who presents for Follow-up (Follow up on ct scan. Cold sxs: cough and chest congestion ).  HPI  Patient has a dry cough   Patient is still feeling sck   Stuffy   Chest hurts from coughing.   Chest congested.     CT abdomen showed decrease int he size of the lymph nodes.       Review of Systems    Past Medical History:   Diagnosis Date    Heart disease, unspecified     Heart problem    Personal history of other diseases of the nervous system and sense organs     History of Parkinson's disease    Personal history of other endocrine, nutritional and metabolic disease     History of high cholesterol       Past Surgical History:   Procedure Laterality Date    OTHER SURGICAL HISTORY  2020    Hernia repair    OTHER SURGICAL HISTORY  2020    Gallbladder surgery    OTHER SURGICAL HISTORY  2020    Shoulder surgery    OTHER SURGICAL HISTORY  2020    Sinus surgery    OTHER SURGICAL HISTORY  2020    Elbow surgery      Social History     Socioeconomic History    Marital status:    Tobacco Use    Smoking status: Former     Types: Cigarettes     Passive exposure: Past    Smokeless tobacco: Never   Substance and Sexual Activity    Alcohol use: Yes    Drug use: Never      Family History   Problem Relation Name Age of Onset    Cancer Father         MEDICATIONS AND ALLERGIES:    ALLERGIES Atorvastatin, Clarithromycin, and Penicillins    MEDICATIONS   Current Outpatient Medications on File Prior to Visit   Medication Sig Dispense Refill    ascorbic acid, vitamin C, 500 mg capsule Take by mouth.      aspirin 81 mg EC tablet Take by mouth once daily.      carbidopa-levodopa (Sinemet)  mg tablet Take 1.5 tablets by mouth 4 times a day. 180 tablet 0    [] doxycycline (Vibramycin) 100 mg capsule Take 1 capsule (100 mg) by mouth 2 times a day for 7 days. Take with at least 8 ounces (large glass) of water, do not lie down for 30 minutes  "after 14 capsule 0    mirtazapine (Remeron) 15 mg tablet Take 1/2 Pill AT Bedtime For 2 Weeks Then Increase To A Full Pill AT Bedtime      multivitamin with minerals (Adults Multivitamin) tablet Take by mouth.      omega 3-dha-epa-fish oil (Fish OiL) 1,200 (144-216) mg capsule Take by mouth.      omeprazole (PriLOSEC) 40 mg DR capsule 1 BY MOUTH ONCE DAILY ( INCREASED FROM 20MG ON 6/9/23) 90 capsule 3    rosuvastatin (Crestor) 20 mg tablet Take by mouth once daily.      tamsulosin (Flomax) 0.4 mg 24 hr capsule Take 1 capsule (0.4 mg) by mouth once daily at bedtime.      zinc gluconate 50 mg tablet once every 24 hours.       No current facility-administered medications on file prior to visit.              Objective   Visit Vitals  /67   Pulse 57   Temp 36.6 °C (97.8 °F)   Ht 1.702 m (5' 7\")   Wt 78.9 kg (174 lb)   SpO2 92%   BMI 27.25 kg/m²   Smoking Status Former   BSA 1.93 m²          11/29/2023     9:51 AM 4/5/2024    10:59 AM 7/24/2024     1:04 PM 8/15/2024    10:45 AM 8/28/2024    12:58 PM 10/4/2024    12:56 PM 11/27/2024     1:49 PM   Vitals   Systolic 120 126 128 112 110 114 135   Diastolic 74 82 80 72 70 70 67   BP Location   Left arm Left arm      Heart Rate 62 70 64 60 56 58 57   Temp 36.2 °C (97.1 °F) 36.7 °C (98 °F) 36.5 °C (97.7 °F) 36.2 °C (97.1 °F) 36.6 °C (97.8 °F) 36.4 °C (97.6 °F) 36.6 °C (97.8 °F)   Resp 16  16 16      Height  1.702 m (5' 7\")   1.702 m (5' 7\") 1.702 m (5' 7\") 1.702 m (5' 7\")   Weight (lb) 180 179 170 173  173 174   BMI 28.19 kg/m2 28.04 kg/m2 26.63 kg/m2 27.1 kg/m2 27.1 kg/m2 27.1 kg/m2 27.25 kg/m2   BSA (m2) 1.96 m2 1.96 m2 1.91 m2 1.93 m2 1.93 m2 1.93 m2 1.93 m2   Visit Report Report Report Report Report Report Report Report     Physical Exam    Constitutional: awake; alert, interactive; in no acute distress; well nourished and well developed  ENT: ears and nose were normal in appearance;  Eyes: pupils equal and round  Pulmonary: no respiratory distress and normal " respiratory rhythm and effort  Skin: normal skin color and pigmentation;  Psychiatric: oriented to person, place, and time; affect was normal and the mood was normal     Wheezing on lung exam , no rhnochi heard  Assessment & Plan  Upper respiratory tract infection, unspecified type  Will kelvin jimenez levoloxain   Prednisone   Albtuerol PRN   Order chest Xray to rule out pneumonia   Alarming signs   Instructed pt to contact clinic is symptoms fail to improve or to return to clinic earlier if symptoms worsen   Counseled pt on warning signs of when to present to ER, they verbalized understanding.   I explained the risk of antibiotics that could include diarrhea, development or resistant strains , Cdiff infection , profuse diarrhea , other opportunistic infection explained , possible allergies and she was advised to review the medication leaflet before she starts the treatment , she verbalized understanding and consented on starting the treatment.     Orders:    levoFLOXacin (Levaquin) 500 mg tablet; Take 1 tablet (500 mg) by mouth once daily for 7 days.    predniSONE (Deltasone) 20 mg tablet; Take 2 tablets (40 mg) by mouth once daily for 5 days.    albuterol (ProAir HFA) 90 mcg/actuation inhaler; Inhale 2 puffs every 4 hours if needed for wheezing or shortness of breath.    benzonatate (Tessalon) 100 mg capsule; Take 1 capsule (100 mg) by mouth 3 times a day as needed for cough. Do not crush or chew.    XR chest 2 views; Future    Abdominal mass, unspecified abdominal location  CT scan showed decreased size of the lymph nodes.   . No acute abdominal or pelvic pathology.  2. Decrease in size of a right mesenteric lymph node. The short axis  measures less than 1 cm.  3. Colonic diverticula with no diverticulitis. Constipation.  4. Unchanged 1.5 x 0.9 cm focal bulge off the right lateral wall of  the infrarenal abdominal aorta.  5. Status post cholecystectomy.  6. Benign renal cysts.  7. Status post left inguinal hernia  repair.       Diverticulitis  No acute complaints       Parkinson's disease with dyskinesia without fluctuating manifestations  Stable , follows with neurology

## 2024-11-29 ENCOUNTER — HOSPITAL ENCOUNTER (OUTPATIENT)
Dept: RADIOLOGY | Facility: CLINIC | Age: 84
End: 2024-11-29
Payer: COMMERCIAL

## 2024-12-23 ENCOUNTER — OFFICE VISIT (OUTPATIENT)
Dept: URGENT CARE | Facility: CLINIC | Age: 84
End: 2024-12-23
Payer: COMMERCIAL

## 2024-12-23 VITALS
WEIGHT: 174 LBS | DIASTOLIC BLOOD PRESSURE: 64 MMHG | OXYGEN SATURATION: 94 % | HEART RATE: 75 BPM | BODY MASS INDEX: 27.25 KG/M2 | SYSTOLIC BLOOD PRESSURE: 128 MMHG | TEMPERATURE: 97.7 F

## 2024-12-23 DIAGNOSIS — R05.1 ACUTE COUGH: ICD-10-CM

## 2024-12-23 DIAGNOSIS — J22 LOWER RESPIRATORY INFECTION: Primary | ICD-10-CM

## 2024-12-23 LAB — POC SARS-COV-2 AG BINAX: NORMAL

## 2024-12-23 PROCEDURE — 99204 OFFICE O/P NEW MOD 45 MIN: CPT | Performed by: PHYSICIAN ASSISTANT

## 2024-12-23 PROCEDURE — 1159F MED LIST DOCD IN RCRD: CPT | Performed by: PHYSICIAN ASSISTANT

## 2024-12-23 PROCEDURE — 87636 SARSCOV2 & INF A&B AMP PRB: CPT

## 2024-12-23 PROCEDURE — 87811 SARS-COV-2 COVID19 W/OPTIC: CPT | Performed by: PHYSICIAN ASSISTANT

## 2024-12-23 PROCEDURE — 3074F SYST BP LT 130 MM HG: CPT | Performed by: PHYSICIAN ASSISTANT

## 2024-12-23 PROCEDURE — 3078F DIAST BP <80 MM HG: CPT | Performed by: PHYSICIAN ASSISTANT

## 2024-12-23 RX ORDER — BROMPHENIRAMINE MALEATE, PSEUDOEPHEDRINE HYDROCHLORIDE, AND DEXTROMETHORPHAN HYDROBROMIDE 2; 30; 10 MG/5ML; MG/5ML; MG/5ML
5 SYRUP ORAL 4 TIMES DAILY PRN
Qty: 140 ML | Refills: 0 | Status: SHIPPED | OUTPATIENT
Start: 2024-12-23 | End: 2024-12-30

## 2024-12-23 RX ORDER — DOXYCYCLINE 100 MG/1
100 CAPSULE ORAL 2 TIMES DAILY
Qty: 20 CAPSULE | Refills: 0 | Status: SHIPPED | OUTPATIENT
Start: 2024-12-23 | End: 2025-01-02

## 2024-12-23 RX ORDER — FLUTICASONE PROPIONATE AND SALMETEROL 250; 50 UG/1; UG/1
1 POWDER RESPIRATORY (INHALATION)
Qty: 60 EACH | Refills: 0 | Status: SHIPPED | OUTPATIENT
Start: 2024-12-23 | End: 2025-01-22

## 2024-12-23 ASSESSMENT — ENCOUNTER SYMPTOMS: COUGH: 1

## 2024-12-23 NOTE — PROGRESS NOTES
Subjective   History  Luis Armando Malin is a 84 y.o. male who presents for Cough.    Patient presents with cough, chest tightness, low energy, loss of taste, sinus congestion. He has been sick for over 1 month. He saw Dr. Mata around that time, and he told him that he didn't have PNA at that time, but treated for a lower respiratory infection. Was given prednisone, abx, inhaler. Started to feel better. Around 10 days ago, he started to feel congested again. Cannot sleep because he is coughing so much. Denies: fever, chills, aches, headache, dizziness, CP, abdominal pain, N/V/D, rash, swelling, bruising, ear pain, sore throat. He had a stent placed around 20 years ago, but no longer sees a cardiologist, is not treated for heart issues. Tried Mucinex 5 days ago without relief.      History provided by:  Patient  Cough      Past Surgical History:   Procedure Laterality Date    OTHER SURGICAL HISTORY  06/25/2020    Hernia repair    OTHER SURGICAL HISTORY  06/25/2020    Gallbladder surgery    OTHER SURGICAL HISTORY  06/25/2020    Shoulder surgery    OTHER SURGICAL HISTORY  06/25/2020    Sinus surgery    OTHER SURGICAL HISTORY  06/25/2020    Elbow surgery     Social History     Tobacco Use    Smoking status: Former     Types: Cigarettes     Passive exposure: Past    Smokeless tobacco: Never   Substance Use Topics    Alcohol use: Yes    Drug use: Never         Review of Systems   Respiratory:  Positive for cough.    All other systems reviewed and are negative.      Objective   Vital Signs  /64   Pulse 75   Temp 36.5 °C (97.7 °F)   Wt 78.9 kg (174 lb)   SpO2 94%   BMI 27.25 kg/m²    All vitals have been reviewed and are stable.     Physical Exam  Vitals reviewed.   Constitutional:       General: He is awake.      Appearance: Normal appearance. He is well-developed.   HENT:      Head: Normocephalic and atraumatic.      Right Ear: Tympanic membrane and ear canal normal.      Left Ear: Tympanic membrane and ear canal  normal.      Nose: Nose normal.      Mouth/Throat:      Lips: Pink.      Pharynx: Oropharynx is clear.   Cardiovascular:      Rate and Rhythm: Normal rate and regular rhythm.   Pulmonary:      Effort: Pulmonary effort is normal.      Breath sounds: Decreased air movement present. Decreased breath sounds and wheezing (diffuse, end expiratory) present. No rhonchi or rales.   Musculoskeletal:      Cervical back: Full passive range of motion without pain.      Right lower leg: No edema.      Left lower leg: No edema.   Skin:     General: Skin is warm and dry.      Findings: No lesion or rash.   Neurological:      General: No focal deficit present.      Mental Status: He is alert and oriented to person, place, and time.      Cranial Nerves: No facial asymmetry.      Motor: Motor function is intact.      Gait: Gait is intact.   Psychiatric:         Attention and Perception: Attention normal.         Mood and Affect: Mood and affect normal.       Assessment/Plan       Problem List Items Addressed This Visit    None  Visit Diagnoses       Lower respiratory infection    -  Primary    Relevant Medications    doxycycline (Monodox) 100 mg capsule    fluticasone propion-salmeteroL (Advair Diskus) 250-50 mcg/dose diskus inhaler    brompheniramine-pseudoeph-DM 2-30-10 mg/5 mL syrup    Other Relevant Orders    XR chest 2 views    Acute cough        Relevant Orders    POCT BinaxNOW Covid-19 Ag Card manually resulted (Completed)    Sars-CoV-2 PCR    Influenza A, and B PCR          Negative COVID in the office  COVID and flu tests sent to lab   CXR ordered - can have this done anytime  Increase rest and fluids   Doxycycline, Bromfed, and steroid inhaler called in  Schedule follow up with Dr. Mata if no improvement in the next few days     Patient education provided to patient and documented in AVS. Red flag symptoms reviewed with patient and all questions answered. Patient or parent/guardian verbalized understanding and agreement  with care plan as above. All in office testing reviewed with patient. If symptoms worsen or do not improve, patient is to follow up with PCP or report to the ER.

## 2024-12-23 NOTE — PATIENT INSTRUCTIONS
Negative COVID in the office  COVID and flu tests sent to lab   CXR ordered - can have this done anytime  Increase rest and fluids   Doxycycline, Bromfed, and steroid inhaler called in  Schedule follow up with Dr. Mata if no improvement in the next few days

## 2024-12-24 LAB
FLUAV RNA RESP QL NAA+PROBE: NOT DETECTED
FLUBV RNA RESP QL NAA+PROBE: NOT DETECTED
SARS-COV-2 RNA RESP QL NAA+PROBE: NOT DETECTED

## 2024-12-26 ENCOUNTER — HOSPITAL ENCOUNTER (OUTPATIENT)
Dept: RADIOLOGY | Facility: CLINIC | Age: 84
Discharge: HOME | End: 2024-12-26
Payer: COMMERCIAL

## 2024-12-26 DIAGNOSIS — J22 LOWER RESPIRATORY INFECTION: ICD-10-CM

## 2024-12-26 PROCEDURE — 71046 X-RAY EXAM CHEST 2 VIEWS: CPT

## 2025-01-07 ENCOUNTER — OFFICE VISIT (OUTPATIENT)
Dept: PRIMARY CARE | Facility: CLINIC | Age: 85
End: 2025-01-07
Payer: MEDICARE

## 2025-01-07 VITALS
SYSTOLIC BLOOD PRESSURE: 130 MMHG | BODY MASS INDEX: 27.47 KG/M2 | HEART RATE: 72 BPM | TEMPERATURE: 97.1 F | OXYGEN SATURATION: 94 % | RESPIRATION RATE: 18 BRPM | HEIGHT: 67 IN | DIASTOLIC BLOOD PRESSURE: 70 MMHG | WEIGHT: 175 LBS

## 2025-01-07 DIAGNOSIS — G20.B1 PARKINSON'S DISEASE WITH DYSKINESIA WITHOUT FLUCTUATING MANIFESTATIONS: ICD-10-CM

## 2025-01-07 DIAGNOSIS — J44.9 CHRONIC OBSTRUCTIVE PULMONARY DISEASE, UNSPECIFIED COPD TYPE (MULTI): ICD-10-CM

## 2025-01-07 DIAGNOSIS — J22 LOWER RESPIRATORY INFECTION: ICD-10-CM

## 2025-01-07 DIAGNOSIS — R06.00 DYSPNEA, UNSPECIFIED TYPE: Primary | ICD-10-CM

## 2025-01-07 DIAGNOSIS — G47.33 OBSTRUCTIVE SLEEP APNEA SYNDROME: ICD-10-CM

## 2025-01-07 PROCEDURE — 3075F SYST BP GE 130 - 139MM HG: CPT | Performed by: INTERNAL MEDICINE

## 2025-01-07 PROCEDURE — 3078F DIAST BP <80 MM HG: CPT | Performed by: INTERNAL MEDICINE

## 2025-01-07 PROCEDURE — 99213 OFFICE O/P EST LOW 20 MIN: CPT | Performed by: INTERNAL MEDICINE

## 2025-01-07 PROCEDURE — 1159F MED LIST DOCD IN RCRD: CPT | Performed by: INTERNAL MEDICINE

## 2025-01-07 PROCEDURE — 1036F TOBACCO NON-USER: CPT | Performed by: INTERNAL MEDICINE

## 2025-01-07 RX ORDER — FLUTICASONE PROPIONATE AND SALMETEROL 250; 50 UG/1; UG/1
1 POWDER RESPIRATORY (INHALATION)
Qty: 60 EACH | Refills: 0 | Status: SHIPPED | OUTPATIENT
Start: 2025-01-07 | End: 2025-02-06

## 2025-01-07 RX ORDER — ALBUTEROL SULFATE 90 UG/1
2 INHALANT RESPIRATORY (INHALATION) EVERY 6 HOURS PRN
Qty: 18 G | Refills: 11 | Status: SHIPPED | OUTPATIENT
Start: 2025-01-07 | End: 2026-01-07

## 2025-01-07 NOTE — PROGRESS NOTES
"Subjective   Patient ID: Luis Armando Malin is a 84 y.o. male who presents for Sinusitis.    HPI   He has been sick for few months, with sinus and chest congestion , had course of several ATBs , went to Urgent Care on 12.23/24 , had negative CxR for infection / pneumonia or effusion.  He has residual head , sinus , nasal stuffiness , he uses cpap at [facility] daily. He has Parkinson's  No fever, chills, headache, ear pain , sorethroat , productive cough or dyspnea.     Review of Systems  See HPI  Objective   /70 (BP Location: Left arm, Patient Position: Sitting, BP Cuff Size: Adult)   Pulse 72   Temp 36.2 °C (97.1 °F) (Temporal)   Resp 18   Ht 1.702 m (5' 7\")   Wt 79.4 kg (175 lb)   SpO2 94%   BMI 27.41 kg/m²     Physical Exam  Constitutional:       Appearance: Normal appearance.   HENT:      Nose: Congestion present.      Mouth/Throat:      Mouth: Mucous membranes are moist.      Pharynx: Oropharynx is clear. No oropharyngeal exudate or posterior oropharyngeal erythema.   Eyes:      Conjunctiva/sclera: Conjunctivae normal.   Cardiovascular:      Rate and Rhythm: Normal rate and regular rhythm.      Heart sounds: Normal heart sounds.   Pulmonary:      Effort: No respiratory distress.      Breath sounds: Normal breath sounds. No wheezing or rhonchi.   Neurological:      General: No focal deficit present.      Mental Status: He is alert and oriented to person, place, and time.      Comments: Hand tremors         Assessment/Plan        Allergic Rhinitis , sinusitis  COPD  Sleep Apnea   Parkinson's    Zyrtec or claritin, Mucinex  Saline and fluticasone sprays  No ATB given  Get Covid booster  Use albuterol and Advair inhalers  "

## 2025-02-24 ENCOUNTER — OFFICE VISIT (OUTPATIENT)
Dept: PRIMARY CARE | Facility: CLINIC | Age: 85
End: 2025-02-24
Payer: MEDICARE

## 2025-02-24 VITALS
TEMPERATURE: 97.8 F | HEART RATE: 65 BPM | HEIGHT: 67 IN | DIASTOLIC BLOOD PRESSURE: 77 MMHG | SYSTOLIC BLOOD PRESSURE: 146 MMHG | OXYGEN SATURATION: 95 % | BODY MASS INDEX: 27.41 KG/M2

## 2025-02-24 DIAGNOSIS — J32.9 CHRONIC SINUSITIS, UNSPECIFIED LOCATION: Primary | ICD-10-CM

## 2025-02-24 PROCEDURE — 1159F MED LIST DOCD IN RCRD: CPT | Performed by: FAMILY MEDICINE

## 2025-02-24 PROCEDURE — 99214 OFFICE O/P EST MOD 30 MIN: CPT | Performed by: FAMILY MEDICINE

## 2025-02-24 PROCEDURE — 3078F DIAST BP <80 MM HG: CPT | Performed by: FAMILY MEDICINE

## 2025-02-24 PROCEDURE — G2211 COMPLEX E/M VISIT ADD ON: HCPCS | Performed by: FAMILY MEDICINE

## 2025-02-24 PROCEDURE — 1036F TOBACCO NON-USER: CPT | Performed by: FAMILY MEDICINE

## 2025-02-24 PROCEDURE — 3077F SYST BP >= 140 MM HG: CPT | Performed by: FAMILY MEDICINE

## 2025-02-24 RX ORDER — METHYLPREDNISOLONE 4 MG/1
TABLET ORAL
Qty: 21 TABLET | Refills: 0 | Status: SHIPPED | OUTPATIENT
Start: 2025-02-24 | End: 2025-03-02

## 2025-02-24 RX ORDER — DOXYCYCLINE 100 MG/1
100 CAPSULE ORAL 2 TIMES DAILY
Qty: 42 CAPSULE | Refills: 0 | Status: SHIPPED | OUTPATIENT
Start: 2025-02-24 | End: 2025-02-24 | Stop reason: WASHOUT

## 2025-02-24 RX ORDER — CEFUROXIME AXETIL 250 MG/1
250 TABLET ORAL 2 TIMES DAILY
Qty: 20 TABLET | Refills: 0 | Status: SHIPPED | OUTPATIENT
Start: 2025-02-24 | End: 2025-03-06

## 2025-02-24 ASSESSMENT — PATIENT HEALTH QUESTIONNAIRE - PHQ9
SUM OF ALL RESPONSES TO PHQ9 QUESTIONS 1 AND 2: 0
2. FEELING DOWN, DEPRESSED OR HOPELESS: NOT AT ALL
1. LITTLE INTEREST OR PLEASURE IN DOING THINGS: NOT AT ALL

## 2025-02-24 NOTE — PROGRESS NOTES
Subjective   Patient ID: Luis Armando Malin is a 84 y.o. male who presents for Sinusitis (Been have abx since nov and not getting better. Sxs: headache, yellow mucus).  HPI  Patient is having isnus infection   4 courses of antibiotics since 4 months   Since November   Doxyclin x2   Levofloxacin x1     Still having symptoms   Mucous yellow secreations   Headache       Took allegra   Now he was mucous yellow secreations   Has been sick for 4 month.   He has a headache , he wears a CPAP   Coughing up all this yellow stuff   Get nerveous and shaky   His CPAP is clearn       Review of Systems    Past Medical History:   Diagnosis Date    Heart disease, unspecified     Heart problem    Personal history of other diseases of the nervous system and sense organs     History of Parkinson's disease    Personal history of other endocrine, nutritional and metabolic disease     History of high cholesterol       Past Surgical History:   Procedure Laterality Date    OTHER SURGICAL HISTORY  06/25/2020    Hernia repair    OTHER SURGICAL HISTORY  06/25/2020    Gallbladder surgery    OTHER SURGICAL HISTORY  06/25/2020    Shoulder surgery    OTHER SURGICAL HISTORY  06/25/2020    Sinus surgery    OTHER SURGICAL HISTORY  06/25/2020    Elbow surgery      Social History     Socioeconomic History    Marital status:    Tobacco Use    Smoking status: Former     Types: Cigarettes     Passive exposure: Past    Smokeless tobacco: Never   Vaping Use    Vaping status: Never Used   Substance and Sexual Activity    Alcohol use: Yes    Drug use: Never      Family History   Problem Relation Name Age of Onset    Cancer Father         MEDICATIONS AND ALLERGIES:    ALLERGIES Atorvastatin, Clarithromycin, and Penicillins    MEDICATIONS   Current Outpatient Medications on File Prior to Visit   Medication Sig Dispense Refill    albuterol 90 mcg/actuation inhaler Inhale 2 puffs every 6 hours if needed for wheezing. 18 g 11    ascorbic acid, vitamin C, 500 mg  "capsule Take by mouth.      aspirin 81 mg EC tablet Take by mouth once daily.      carbidopa-levodopa (Sinemet)  mg tablet Take 1.5 tablets by mouth 4 times a day. 180 tablet 0    fluticasone propion-salmeteroL (Advair Diskus) 250-50 mcg/dose diskus inhaler Inhale 1 puff 2 times a day. Rinse mouth with water after use to reduce aftertaste and incidence of candidiasis. Do not swallow. 60 each 0    mirtazapine (Remeron) 15 mg tablet Take 1/2 Pill AT Bedtime For 2 Weeks Then Increase To A Full Pill AT Bedtime      multivitamin with minerals (Adults Multivitamin) tablet Take by mouth.      omega 3-dha-epa-fish oil (Fish OiL) 1,200 (144-216) mg capsule Take by mouth.      omeprazole (PriLOSEC) 40 mg DR capsule 1 BY MOUTH ONCE DAILY ( INCREASED FROM 20MG ON 6/9/23) 90 capsule 3    rosuvastatin (Crestor) 20 mg tablet Take by mouth once daily.      tamsulosin (Flomax) 0.4 mg 24 hr capsule Take 1 capsule (0.4 mg) by mouth once daily at bedtime.      zinc gluconate 50 mg tablet once every 24 hours.       No current facility-administered medications on file prior to visit.              Objective   Visit Vitals  /77   Pulse 65   Temp 36.6 °C (97.8 °F)   Ht 1.702 m (5' 7\")   SpO2 95%   BMI 27.41 kg/m²   Smoking Status Former   BSA 1.94 m²          8/15/2024    10:45 AM 8/28/2024    12:58 PM 10/4/2024    12:56 PM 11/27/2024     1:49 PM 12/23/2024    10:02 AM 1/7/2025     1:05 PM 2/24/2025     1:57 PM   Vitals   Systolic 112 110 114 135 128 130 146   Diastolic 72 70 70 67 64 70 77   BP Location Left arm     Left arm    Heart Rate 60 56 58 57 75 72 65   Temp 36.2 °C (97.1 °F) 36.6 °C (97.8 °F) 36.4 °C (97.6 °F) 36.6 °C (97.8 °F) 36.5 °C (97.7 °F) 36.2 °C (97.1 °F) 36.6 °C (97.8 °F)   Resp 16     18    Height  1.702 m (5' 7\") 1.702 m (5' 7\") 1.702 m (5' 7\")  1.702 m (5' 7\") 1.702 m (5' 7\")   Weight (lb) 173  173 174 174 175    BMI 27.1 kg/m2 27.1 kg/m2 27.1 kg/m2 27.25 kg/m2 27.25 kg/m2 27.41 kg/m2 27.41 kg/m2   BSA (m2) " 1.93 m2 1.93 m2 1.93 m2 1.93 m2 1.93 m2 1.94 m2 1.94 m2   Visit Report Report Report Report Report Report Report Report     Physical Exam  Tenderness on palpating the frontal sinusis     Assessment & Plan  Chronic sinusitis, unspecified location  4 courses of antibitoics   Will treat with cefuroxime   Reported that the reaction to penicillin he had was due to over dosing during treatment for preorbital cellulitis.   Advsied on alarming signs , contact us for any changes.   Alarming signs   Instructed pt to contact clinic is symptoms fail to improve or to return to clinic earlier if symptoms worsen   Counseled pt on warning signs of when to present to ER, they verbalized understanding.   Will order CT scan for further evaluation   Alarming signs   Instructed pt to contact clinic is symptoms fail to improve or to return to clinic earlier if symptoms worsen   Counseled pt on warning signs of when to present to ER, they verbalized understanding.         Orders:    methylPREDNISolone (Medrol Dospak) 4 mg tablets; Take as directed on package.    cefuroxime (Ceftin) 250 mg tablet; Take 1 tablet (250 mg) by mouth 2 times a day for 10 days.    CT facial bones w and wo IV contrast; Future

## 2025-02-24 NOTE — ASSESSMENT & PLAN NOTE
4 courses of antibitoics   Will treat with cefuroxime   Reported that the reaction to penicillin he had was due to over dosing during treatment for preorbital cellulitis.   Advsied on alarming signs , contact us for any changes.   Alarming signs   Instructed pt to contact clinic is symptoms fail to improve or to return to clinic earlier if symptoms worsen   Counseled pt on warning signs of when to present to ER, they verbalized understanding.   Will order CT scan for further evaluation   Alarming signs   Instructed pt to contact clinic is symptoms fail to improve or to return to clinic earlier if symptoms worsen   Counseled pt on warning signs of when to present to ER, they verbalized understanding.         Orders:    methylPREDNISolone (Medrol Dospak) 4 mg tablets; Take as directed on package.    cefuroxime (Ceftin) 250 mg tablet; Take 1 tablet (250 mg) by mouth 2 times a day for 10 days.    CT facial bones w and wo IV contrast; Future

## 2025-03-05 NOTE — PROGRESS NOTES
Nurse  visit done    Patient would like communication of their results via:    Cell Phone:   Telephone Information:   Mobile [phone]     Okay to leave a message containing results? Yes     Health Maintenance Due   Topic Date Due   • Cervical Cancer Screening HPV CO-Testing  10/16/2018     Patient is up to date, no discussion needed..

## 2025-03-10 ENCOUNTER — APPOINTMENT (OUTPATIENT)
Dept: PRIMARY CARE | Facility: CLINIC | Age: 85
End: 2025-03-10
Payer: MEDICARE

## 2025-03-12 ENCOUNTER — HOSPITAL ENCOUNTER (OUTPATIENT)
Dept: RADIOLOGY | Facility: CLINIC | Age: 85
Discharge: HOME | End: 2025-03-12
Payer: MEDICARE

## 2025-03-12 DIAGNOSIS — J32.9 CHRONIC SINUSITIS, UNSPECIFIED LOCATION: ICD-10-CM

## 2025-03-12 LAB
CREAT SERPL-MCNC: 1.3 MG/DL (ref 0.6–1.3)
GFR SERPL CREATININE-BSD FRML MDRD: 54 ML/MIN/1.73M*2

## 2025-03-12 PROCEDURE — 82565 ASSAY OF CREATININE: CPT

## 2025-03-12 PROCEDURE — 2550000001 HC RX 255 CONTRASTS: Performed by: FAMILY MEDICINE

## 2025-03-12 PROCEDURE — 70487 CT MAXILLOFACIAL W/DYE: CPT

## 2025-03-12 RX ADMIN — IOHEXOL 75 ML: 350 INJECTION, SOLUTION INTRAVENOUS at 14:47

## 2025-03-14 ENCOUNTER — APPOINTMENT (OUTPATIENT)
Dept: PRIMARY CARE | Facility: CLINIC | Age: 85
End: 2025-03-14
Payer: MEDICARE

## 2025-03-14 VITALS
SYSTOLIC BLOOD PRESSURE: 128 MMHG | WEIGHT: 170 LBS | BODY MASS INDEX: 26.68 KG/M2 | HEIGHT: 67 IN | DIASTOLIC BLOOD PRESSURE: 78 MMHG | HEART RATE: 63 BPM | OXYGEN SATURATION: 93 %

## 2025-03-14 DIAGNOSIS — G89.29 CHRONIC RIGHT-SIDED LOW BACK PAIN WITH RIGHT-SIDED SCIATICA: ICD-10-CM

## 2025-03-14 DIAGNOSIS — J32.0 CHRONIC MAXILLARY SINUSITIS: Primary | ICD-10-CM

## 2025-03-14 DIAGNOSIS — M54.41 CHRONIC RIGHT-SIDED LOW BACK PAIN WITH RIGHT-SIDED SCIATICA: ICD-10-CM

## 2025-03-14 DIAGNOSIS — I71.40 ABDOMINAL AORTIC ANEURYSM (AAA) WITHOUT RUPTURE, UNSPECIFIED PART (CMS-HCC): ICD-10-CM

## 2025-03-14 PROCEDURE — G2211 COMPLEX E/M VISIT ADD ON: HCPCS | Performed by: FAMILY MEDICINE

## 2025-03-14 PROCEDURE — 99214 OFFICE O/P EST MOD 30 MIN: CPT | Performed by: FAMILY MEDICINE

## 2025-03-14 PROCEDURE — 3074F SYST BP LT 130 MM HG: CPT | Performed by: FAMILY MEDICINE

## 2025-03-14 PROCEDURE — 1159F MED LIST DOCD IN RCRD: CPT | Performed by: FAMILY MEDICINE

## 2025-03-14 PROCEDURE — 3078F DIAST BP <80 MM HG: CPT | Performed by: FAMILY MEDICINE

## 2025-03-14 RX ORDER — LORATADINE 10 MG/1
10 TABLET ORAL DAILY
Qty: 30 TABLET | Refills: 2 | Status: SHIPPED | OUTPATIENT
Start: 2025-03-14 | End: 2025-06-12

## 2025-03-14 RX ORDER — FLUTICASONE PROPIONATE 50 MCG
1 SPRAY, SUSPENSION (ML) NASAL DAILY
Qty: 16 G | Refills: 11 | Status: SHIPPED | OUTPATIENT
Start: 2025-03-14 | End: 2026-03-14

## 2025-03-14 NOTE — ASSESSMENT & PLAN NOTE
CT scan showing bulging disk and possible stenosis   Advised to stay active   Follow up with ortho , he wanted to follow with [facility].

## 2025-03-14 NOTE — PROGRESS NOTES
Subjective   Patient ID: Luis Armando Malin is a 84 y.o. male who presents for Follow-up.  HPI  CT showing possible sinusisits   Feelign better   Still congested   No pain     Review of Systems    Past Medical History:   Diagnosis Date    Heart disease, unspecified     Heart problem    Personal history of other diseases of the nervous system and sense organs     History of Parkinson's disease    Personal history of other endocrine, nutritional and metabolic disease     History of high cholesterol       Past Surgical History:   Procedure Laterality Date    OTHER SURGICAL HISTORY  06/25/2020    Hernia repair    OTHER SURGICAL HISTORY  06/25/2020    Gallbladder surgery    OTHER SURGICAL HISTORY  06/25/2020    Shoulder surgery    OTHER SURGICAL HISTORY  06/25/2020    Sinus surgery    OTHER SURGICAL HISTORY  06/25/2020    Elbow surgery      Social History     Socioeconomic History    Marital status:    Tobacco Use    Smoking status: Former     Types: Cigarettes     Passive exposure: Past    Smokeless tobacco: Never   Vaping Use    Vaping status: Never Used   Substance and Sexual Activity    Alcohol use: Yes    Drug use: Never      Family History   Problem Relation Name Age of Onset    Cancer Father         MEDICATIONS AND ALLERGIES:    ALLERGIES Atorvastatin, Clarithromycin, and Penicillins    MEDICATIONS   Current Outpatient Medications on File Prior to Visit   Medication Sig Dispense Refill    albuterol 90 mcg/actuation inhaler Inhale 2 puffs every 6 hours if needed for wheezing. 18 g 11    ascorbic acid, vitamin C, 500 mg capsule Take by mouth.      aspirin 81 mg EC tablet Take by mouth once daily.      carbidopa-levodopa (Sinemet)  mg tablet Take 1.5 tablets by mouth 4 times a day. 180 tablet 0    fluticasone propion-salmeteroL (Advair Diskus) 250-50 mcg/dose diskus inhaler Inhale 1 puff 2 times a day. Rinse mouth with water after use to reduce aftertaste and incidence of candidiasis. Do not swallow. 60 each  "0    mirtazapine (Remeron) 15 mg tablet Take 1/2 Pill AT Bedtime For 2 Weeks Then Increase To A Full Pill AT Bedtime      multivitamin with minerals (Adults Multivitamin) tablet Take by mouth.      omega 3-dha-epa-fish oil (Fish OiL) 1,200 (144-216) mg capsule Take by mouth.      omeprazole (PriLOSEC) 40 mg DR capsule 1 BY MOUTH ONCE DAILY ( INCREASED FROM 20MG ON 6/9/23) 90 capsule 3    rosuvastatin (Crestor) 20 mg tablet Take by mouth once daily.      tamsulosin (Flomax) 0.4 mg 24 hr capsule Take 1 capsule (0.4 mg) by mouth once daily at bedtime.      zinc gluconate 50 mg tablet once every 24 hours.       No current facility-administered medications on file prior to visit.              Objective   Visit Vitals  /78 (BP Location: Left arm, Patient Position: Sitting, BP Cuff Size: Adult)   Pulse 63   Ht 1.702 m (5' 7\")   Wt 77.1 kg (170 lb)   SpO2 93%   BMI 26.63 kg/m²   Smoking Status Former   BSA 1.91 m²          8/28/2024    12:58 PM 10/4/2024    12:56 PM 11/27/2024     1:49 PM 12/23/2024    10:02 AM 1/7/2025     1:05 PM 2/24/2025     1:57 PM 3/14/2025     1:18 PM   Vitals   Systolic 110 114 135 128 130 146 128   Diastolic 70 70 67 64 70 77 78   BP Location     Left arm  Left arm   Heart Rate 56 58 57 75 72 65 63   Temp 36.6 °C (97.8 °F) 36.4 °C (97.6 °F) 36.6 °C (97.8 °F) 36.5 °C (97.7 °F) 36.2 °C (97.1 °F) 36.6 °C (97.8 °F)    Resp     18     Height 1.702 m (5' 7\") 1.702 m (5' 7\") 1.702 m (5' 7\")  1.702 m (5' 7\") 1.702 m (5' 7\") 1.702 m (5' 7\")   Weight (lb)  173 174 174 175  170   BMI 27.1 kg/m2 27.1 kg/m2 27.25 kg/m2 27.25 kg/m2 27.41 kg/m2 27.41 kg/m2 26.63 kg/m2   BSA (m2) 1.93 m2 1.93 m2 1.93 m2 1.93 m2 1.94 m2 1.94 m2 1.91 m2   Visit Report Report Report Report Report Report Report Report     Physical Exam        Assessment & Plan  Chronic maxillary sinusitis  CT scan showing sinus congestion   Postsurgical changes of antrostomy and uncinectomy.      Moderate to extensive opacification of the " paranasal sinuses and  superior/mid nasal cavity. There are air-fluid levels within the  right frontal sinus, sphenoid sinus and right maxillary sinus which  may represent acute sinusitis in the appropriate clinical setting.      There is a small 0.3 cm osseous defect within the floor of the left  maxillary sinus.  Will refer to ENT   Hx of sinus surgery   Will start flonase and loratidine   Advised to contact us for any symptoms of sinus infection so we start antibiotics treamtent , patient is agreable.   Orders:    Referral to ENT; Future    fluticasone (Flonase) 50 mcg/actuation nasal spray; Administer 1 spray into each nostril once daily. Shake gently. Before first use, prime pump. After use, clean tip and replace cap.    loratadine (Claritin) 10 mg tablet; Take 1 tablet (10 mg) by mouth once daily.    Chronic right-sided low back pain with right-sided sciatica  CT scan showing bulging disk and possible stenosis   Advised to stay active   Follow up with ortho , he wanted to follow [facility].        Abdominal aortic aneurysm (AAA) without rupture, unspecified part (CMS-HCC)  Followign with vascular surgeon , plan for yearly imaging.

## 2025-03-14 NOTE — ASSESSMENT & PLAN NOTE
CT scan showing sinus congestion   Postsurgical changes of antrostomy and uncinectomy.      Moderate to extensive opacification of the paranasal sinuses and  superior/mid nasal cavity. There are air-fluid levels within the  right frontal sinus, sphenoid sinus and right maxillary sinus which  may represent acute sinusitis in the appropriate clinical setting.      There is a small 0.3 cm osseous defect within the floor of the left  maxillary sinus.  Will refer to ENT   Hx of sinus surgery   Will start flonase and loratidine   Advised to contact us for any symptoms of sinus infection so we start antibiotics treamtent , patient is agreable.   Orders:    Referral to ENT; Future    fluticasone (Flonase) 50 mcg/actuation nasal spray; Administer 1 spray into each nostril once daily. Shake gently. Before first use, prime pump. After use, clean tip and replace cap.    loratadine (Claritin) 10 mg tablet; Take 1 tablet (10 mg) by mouth once daily.

## 2025-04-05 DIAGNOSIS — J32.0 CHRONIC MAXILLARY SINUSITIS: ICD-10-CM

## 2025-04-07 ENCOUNTER — APPOINTMENT (OUTPATIENT)
Dept: OTOLARYNGOLOGY | Facility: CLINIC | Age: 85
End: 2025-04-07
Payer: MEDICARE

## 2025-04-07 VITALS — WEIGHT: 170 LBS | BODY MASS INDEX: 26.68 KG/M2 | HEIGHT: 67 IN

## 2025-04-07 DIAGNOSIS — J34.89 NASAL OBSTRUCTION: ICD-10-CM

## 2025-04-07 DIAGNOSIS — J34.3 HYPERTROPHY OF BOTH INFERIOR NASAL TURBINATES: ICD-10-CM

## 2025-04-07 DIAGNOSIS — J34.89 NASAL DRAINAGE: ICD-10-CM

## 2025-04-07 DIAGNOSIS — J32.9 CHRONIC RHINOSINUSITIS: Primary | ICD-10-CM

## 2025-04-07 DIAGNOSIS — R44.8 FACIAL PRESSURE: ICD-10-CM

## 2025-04-07 DIAGNOSIS — R09.81 NASAL CONGESTION: ICD-10-CM

## 2025-04-07 DIAGNOSIS — R43.0 ANOSMIA: ICD-10-CM

## 2025-04-07 DIAGNOSIS — J32.0 CHRONIC MAXILLARY SINUSITIS: ICD-10-CM

## 2025-04-07 DIAGNOSIS — J33.9 NASAL POLYPS: ICD-10-CM

## 2025-04-07 PROCEDURE — 87075 CULTR BACTERIA EXCEPT BLOOD: CPT

## 2025-04-07 PROCEDURE — 1036F TOBACCO NON-USER: CPT | Performed by: STUDENT IN AN ORGANIZED HEALTH CARE EDUCATION/TRAINING PROGRAM

## 2025-04-07 PROCEDURE — 87070 CULTURE OTHR SPECIMN AEROBIC: CPT

## 2025-04-07 PROCEDURE — 31231 NASAL ENDOSCOPY DX: CPT | Performed by: STUDENT IN AN ORGANIZED HEALTH CARE EDUCATION/TRAINING PROGRAM

## 2025-04-07 PROCEDURE — 87205 SMEAR GRAM STAIN: CPT

## 2025-04-07 PROCEDURE — 1159F MED LIST DOCD IN RCRD: CPT | Performed by: STUDENT IN AN ORGANIZED HEALTH CARE EDUCATION/TRAINING PROGRAM

## 2025-04-07 PROCEDURE — 99204 OFFICE O/P NEW MOD 45 MIN: CPT | Performed by: STUDENT IN AN ORGANIZED HEALTH CARE EDUCATION/TRAINING PROGRAM

## 2025-04-07 RX ORDER — SOD CHLOR,BICARB/SQUEEZ BOTTLE
PACKET, WITH RINSE DEVICE NASAL
Qty: 1 EACH | Refills: 3 | Status: SHIPPED | OUTPATIENT
Start: 2025-04-07

## 2025-04-07 RX ORDER — PREDNISONE 10 MG/1
TABLET ORAL
Qty: 24 TABLET | Refills: 0 | Status: SHIPPED | OUTPATIENT
Start: 2025-04-07

## 2025-04-07 RX ORDER — LORATADINE 10 MG/1
10 TABLET ORAL DAILY
Qty: 90 TABLET | Refills: 1 | Status: SHIPPED | OUTPATIENT
Start: 2025-04-07

## 2025-04-07 RX ORDER — DOXYCYCLINE 100 MG/1
100 CAPSULE ORAL 2 TIMES DAILY
Qty: 20 CAPSULE | Refills: 0 | Status: SHIPPED | OUTPATIENT
Start: 2025-04-07 | End: 2025-04-17

## 2025-04-07 NOTE — PATIENT INSTRUCTIONS
"- Take doxycycline and prednisone as prescribed.    - Start saline /mometasone rinses    ------------------------------------------------------------------------  Saline/mometasone rinses  Medications Added to Sinus Rinses  Your provider has recommended you include a medication in your sinus rinses. These medications are being used \"off-label,\" meaning that they were not originally developed or approved by the FDA for this use. They are typically prescribed on a case by case basis according to an individual´s condition, response to previous therapy, and specific needs. They are recommended based on the good judgment of your provider and may be changed based on your individual response. You may also periodically require additional oral medications to treat other conditions or problems as they arise. With these medications you should mix your saline solution as usual (commercial packet or saline solution recipe as below). You will then mix in your prescription medication as indicated below. Please refer to your prescription for instructions on how often to use these medications. Please be sure to alert our office if you have any problems or side effects. It is important that you keep your regularly scheduled appointments so that we can continue to monitor your progress and adjust medications as necessary. It is important that you ask questions and understand your treatment plan fully    If at ANY time during your treatment with these products you notice ANY of the symptoms below you should immediately suspend their use and contact the clinic for further advice. Do NOT return to using them until we are able to discuss it with you further    * Rash * Burning/stinging *Dryness * Sore throat    * Pain * Change in/loss of sense of smell * Nausea * Itching      -Mometasone vials:    Add all of the content from one respule/ vial into 8 oz of saline solution; rinse both nostrils as directed    This medication is used to help " reduce swelling and to help prevent the reoccurrence of nasal polyps.      Saline Solution Preparation:    Pre-Made Saline Products    You can purchase a ready-made saline irrigation product from most pharmacies, grocery stores, or natural food stores. NeilMed and Ayr are two common brands. Although we do not recommend one particular brand over another, we do recommend the squirt bottle style instead of the pot. You should use one pre-made powder packet to one full bottle of solution. One full bottle will be used with each rinse session. You should only use distilled or bottled water, not tap or well water, and you should not keep unused solution.      To Use Your Rinse:    Fill irrigation device of your choice carefully and allow the solution to warm up to room temperature. You should not heat the solution in the microwave as this can cause burns    Stand over a sink or in shower. Breathe slowly in through your mouth and squirt the mixture into one nostril until the solution comes out of the other nostril or your mouth.    Repeat this process until you have used about half of the bottle in one nostril; then change and use the remaining solution on the opposite side. You should use the WHOLE bottle in one rinse session.    After use, all rinse devices should be thoroughly cleaned. Please refer to the ´s guidelines with pre-made systems. Warm soapy water may also be used.      ----------------------------------------------------------------------      SYSTEMIC ORAL STEROIDS    Common side effects  It's not infrequent to have some increased appetite or to retain some fluid when you are on oral steroid therapy. You should therefore watch your diet. The initial dose may also make you feel hyperactive, and you may feel somewhat weepy or “blue” as the dose is decreased. However, with appropriate management of the steroid dosage, these effects can usually be minimized. If you experience indigestion or  heartburn, it may be necessary to take special precautions to protect your stomach. If you have had a history of duodenal ulcer, you should inform your doctor and he or she will prescribe some medication to protect your stomach.    Adverse effects  Steroids can reveal your body's tendency:   To form cataracts  To develop glaucoma  To develop high blood pressure  To develop high blood sugar (as with diabetes)  To have mood swings  To develop stomach irritation or ulcer disease  To develop bone-thinning (osteoporosis)  To have menstrual irregularities  A serious but very rare adverse reaction to oral steroids called avascular necrosis can occur. This condition is very rare and can result in permanent damage to a joint where it disintegrates. However, you should inform your physician if you develop significant joint pains while taking oral steroids.    Except in unusual circumstances we typically avoid systemic steroids if you:   Are pregnant or plan to become pregnant  Have a history of:   o bleeding abnormality  o tuberculosis  o glaucoma or cataract disease  o significant clinical depression  o immune deficiency  o avascular necrosis  o severe osteoporosis  When you take steroids   Take your steroids on a full stomach after breakfast in the morning. This will help minimize the side effects of stomach irritation and insomnia.  Do not keep this medicine in the bathroom because of the heat and moisture  Eat a banana a day while on steroids to help replace potassium  Monitor your blood pressure or glucose levels carefully if you have high blood pressure or diabetes  Supplement your diet with calcium 1200mg/d and vitamin D 800 IU/day. Calcium citrate is preferred to calcium carbonate to reduce unwanted side effects (GI upset, kidney stones).   If you have been simultaneously prescribed a quinolone antibiotic (Cipro, Levaquin, or Avelox) avoid strenuous activity as there is a small increased risk of tendonitis and  Achilles tendon rupture.    If you require frequent or continuous steroids and you are at risk for glaucoma or cataract formation or osteoporosis, you should:   Perform weight-bearing exercises daily  Bone density exams   Annual eye examinations    As with other medications:   DO NOT drink alcohol or smoke  DO NOT take more than one dose at a time  DO NOT increase the amount of the dose unless directed by your healthcare provider  DO NOT start taking any new medicine without telling your healthcare provider or pharmacist        This advisory includes selected information only and may not include all side effects of this medicine of interactions with other medications. Consult your healthcare provider, or pharmacist for more information if you have further questions.

## 2025-04-07 NOTE — PROGRESS NOTES
HPI  84-year-old male presenting for initial evaluation of multiple sinonasal issues.    Main Symptoms: Daily sinonasal symptoms include bilateral nasal congestion/obstruction, posterior nasal drainage, facial pressure along the maxillary and frontal regions bilaterally.    Duration: Years, worse over the past 4 months  Laterality: Bilateral  Endorses absent smell  Has received multiple antibiotic courses for the past year without improvement of his symptoms  Patient denies facial pain, facial numbness, dental pain, immunotherapy.    No odynophagia/dysphagia/SOB/dyspnea/hoarseness/fevers/chills/weight loss/night sweats.    Current treatment:  Nasal Steroid: Flonase   Sinus Rinse: No  Antihistamine: Loratadine   Prednisone: No  Other: None    Recent CT: CT facial bones 3/13/2025 personally reviewed notable for bilateral maxillary, ethmoid, sphenoid and frontal edema.  Small defect along the left maxillary floor, no evidence of fistula.    Previous nasal/sinus surgery: Sinonasal surgery over 30 years ago    Past Medical History:   Diagnosis Date    Heart disease, unspecified     Heart problem    Personal history of other diseases of the nervous system and sense organs     History of Parkinson's disease    Personal history of other endocrine, nutritional and metabolic disease     History of high cholesterol        Past Surgical History:   Procedure Laterality Date    OTHER SURGICAL HISTORY  06/25/2020    Hernia repair    OTHER SURGICAL HISTORY  06/25/2020    Gallbladder surgery    OTHER SURGICAL HISTORY  06/25/2020    Shoulder surgery    OTHER SURGICAL HISTORY  06/25/2020    Sinus surgery    OTHER SURGICAL HISTORY  06/25/2020    Elbow surgery        Current Outpatient Medications   Medication Instructions    albuterol 90 mcg/actuation inhaler 2 puffs, inhalation, Every 6 hours PRN    ascorbic acid, vitamin C, 500 mg capsule Take by mouth.    aspirin 81 mg EC tablet Daily    carbidopa-levodopa (Sinemet)  mg tablet  1.5 tablets, oral, 4 times daily    fluticasone (Flonase) 50 mcg/actuation nasal spray 1 spray, Each Nostril, Daily, Shake gently. Before first use, prime pump. After use, clean tip and replace cap.    fluticasone propion-salmeteroL (Advair Diskus) 250-50 mcg/dose diskus inhaler 1 puff, inhalation, 2 times daily RT, Rinse mouth with water after use to reduce aftertaste and incidence of candidiasis. Do not swallow.    loratadine (CLARITIN) 10 mg, oral, Daily    mirtazapine (Remeron) 15 mg tablet Take 1/2 Pill AT Bedtime For 2 Weeks Then Increase To A Full Pill AT Bedtime    multivitamin with minerals (Adults Multivitamin) tablet Take by mouth.    omega 3-dha-epa-fish oil (Fish OiL) 1,200 (144-216) mg capsule Take by mouth.    omeprazole (PriLOSEC) 40 mg DR capsule 1 BY MOUTH ONCE DAILY ( INCREASED FROM 20MG ON 6/9/23)    rosuvastatin (Crestor) 20 mg tablet Daily    tamsulosin (FLOMAX) 0.4 mg, Nightly    zinc gluconate 50 mg tablet Every 24 hours        Allergies   Allergen Reactions    Atorvastatin Other     Muscle aches    Clarithromycin Unknown     Extreme Stomach Upset    Penicillins Unknown        Review of Systems  A detailed 12 point ROS was performed and is negative except as noted in the intake form, HPI and/or Past Medical History    Physical Exam   CONSTITUTIONAL: Well developed, well nourished.  VOICE: Normal voice quality  RESPIRATION: Breathing comfortably, no stridor.  CV: No clubbing/cyanosis/edema in hands.  EYES: EOM Intact, sclera normal.  NEURO: Alert and oriented times 3, Cranial nerves V,VII intact and symmetric bilaterally.  HEAD AND FACE: Symmetric facial features, no masses or lesions, sinuses nontender to palpation.  SALIVARY GLANDS: Parotid and submandibular glands normal bilaterally.  EARS: Normal external ears, external auditory canals, and TMs to otoscopy  NOSE: External nose midline, anterior rhinoscopy is normal with limited visualization to the anterior aspect of the interior  turbinates. No lesions noted.  ORAL CAVITY/OROPHARYNX/LIPS: Normal mucous membranes, normal floor of mouth/tongue/OP, no masses or lesions are noted.  PHARYNGEAL WALLS AND NASOPHARYNX: No masses noted. Mucosa appears clean and moist  NECK/LYMPH: No LAD, no thyroid masses. Trachea palpably midline  SKIN: Neck skin is without injury  PSYCH: Alert and oriented with appropriate mood and affect     Nasal endoscopy:  PROCEDURE NOTE    For better visualization because of septal deviation, turbinate hypertrophy nasal endoscopy was performed after verbal consent was obtained by the patient and/or guardian. Both nostrils were sprayed with a mixture of lidocaine 4% and Afrin. After a sufficient amount of time elapsed for mucosal anesthesia to take place, the nasal endoscope was advanced into the nostril.    The following areas were visualized:  Nasal passage, nasal septum, turbinates, middle meatus, nasopharynx, sinus ostia    The patient tolerated the procedure well and these structures were found to be normal except as follows:  Bilateral inferior turbinate hypertrophy  Septum mostly ML, slight left dorsal deviation  Bilateral generalized mucosal edema  Right: Polyps obstructing the right middle meatus and sphenoethmoid recess.  No mucopurulence, nor masses.  Left: Polyps obstructing the left middle meatus and sphenoethmoid recess, mucopurulence noted within the left middle meatus cultured.  No masses noted.       Assessment  Chronic rhinosinusitis with nasal polyps   Facial pressure  Nasal airway obstruction  Nasal septal deviation  Nasal drainage  Bilateral inferior turbinate hypertrophy  Remote history of sinonasal surgery    Plan  The patient and I discussed their current nasal / sinus symptoms as well as several therapeutic options.  Nasal endoscopy notable for bilateral inferior turbinate hypertrophy, generalized mucosal edema, polyps obstructing the middle meatus and sphenoethmoid recess bilaterally.  In addition  mucopurulent drainage noted within the left middle meatus, cultures obtained.  Doxycycline and prednisone course prescribed.  I would like to begin a nasal hygiene/ medical regimen consisting of saline/mometasone irrigations.     The patient was instructed on the correct technique to administer the topical saline/mometasone mix.  I stressed consistency of usage for maximal benefit. We discussed the rationale for the timing of this therapy and the benefits and potential adverse effects.      RTC 6w

## 2025-04-10 LAB
BACTERIA SPEC CULT: NORMAL
GRAM STN SPEC: NORMAL
GRAM STN SPEC: NORMAL

## 2025-05-21 ENCOUNTER — APPOINTMENT (OUTPATIENT)
Dept: OTOLARYNGOLOGY | Facility: CLINIC | Age: 85
End: 2025-05-21
Payer: MEDICARE

## 2025-05-21 DIAGNOSIS — R44.8 FACIAL PRESSURE: ICD-10-CM

## 2025-05-21 DIAGNOSIS — J34.89 NASAL OBSTRUCTION: ICD-10-CM

## 2025-05-21 DIAGNOSIS — R09.81 NASAL CONGESTION: ICD-10-CM

## 2025-05-21 DIAGNOSIS — J32.9 CHRONIC RHINOSINUSITIS: Primary | ICD-10-CM

## 2025-05-21 DIAGNOSIS — J33.9 NASAL POLYPS: ICD-10-CM

## 2025-05-21 DIAGNOSIS — J34.89 NASAL DRAINAGE: ICD-10-CM

## 2025-05-21 DIAGNOSIS — J34.3 HYPERTROPHY OF BOTH INFERIOR NASAL TURBINATES: ICD-10-CM

## 2025-05-21 DIAGNOSIS — R43.0 ANOSMIA: ICD-10-CM

## 2025-05-21 NOTE — PROGRESS NOTES
HPI  5/21/25  The patient present for follow-up, reports significant improvement of his sinonasal symptomatology following antibiotic and steroid course.  Currently has no sinonasal complaints.  Recall   84-year-old male presenting for initial evaluation of multiple sinonasal issues.    Main Symptoms: Daily sinonasal symptoms include bilateral nasal congestion/obstruction, posterior nasal drainage, facial pressure along the maxillary and frontal regions bilaterally.    Duration: Years, worse over the past 4 months  Laterality: Bilateral  Endorses absent smell  Has received multiple antibiotic courses for the past year without improvement of his symptoms  Patient denies facial pain, facial numbness, dental pain, immunotherapy.    No odynophagia/dysphagia/SOB/dyspnea/hoarseness/fevers/chills/weight loss/night sweats.    Current treatment:  Nasal Steroid: Flonase   Sinus Rinse: No  Antihistamine: Loratadine   Prednisone: No  Other: None    Recent CT: CT facial bones 3/13/2025 personally reviewed notable for bilateral maxillary, ethmoid, sphenoid and frontal edema.  Small defect along the left maxillary floor, no evidence of fistula.    Previous nasal/sinus surgery: Sinonasal surgery over 30 years ago    Past Medical History:   Diagnosis Date    Heart disease, unspecified     Heart problem    Personal history of other diseases of the nervous system and sense organs     History of Parkinson's disease    Personal history of other endocrine, nutritional and metabolic disease     History of high cholesterol        Past Surgical History:   Procedure Laterality Date    OTHER SURGICAL HISTORY  06/25/2020    Hernia repair    OTHER SURGICAL HISTORY  06/25/2020    Gallbladder surgery    OTHER SURGICAL HISTORY  06/25/2020    Shoulder surgery    OTHER SURGICAL HISTORY  06/25/2020    Sinus surgery    OTHER SURGICAL HISTORY  06/25/2020    Elbow surgery        Current Outpatient Medications   Medication Instructions    albuterol 90  mcg/actuation inhaler 2 puffs, inhalation, Every 6 hours PRN    ascorbic acid, vitamin C, 500 mg capsule Take by mouth.    aspirin 81 mg EC tablet Daily    carbidopa-levodopa (Sinemet)  mg tablet 1.5 tablets, oral, 4 times daily    fluticasone propion-salmeteroL (Advair Diskus) 250-50 mcg/dose diskus inhaler 1 puff, inhalation, 2 times daily RT, Rinse mouth with water after use to reduce aftertaste and incidence of candidiasis. Do not swallow.    loratadine (CLARITIN) 10 mg, oral, Daily    mirtazapine (Remeron) 15 mg tablet Take 1/2 Pill AT Bedtime For 2 Weeks Then Increase To A Full Pill AT Bedtime    multivitamin with minerals (Adults Multivitamin) tablet Take by mouth.    omega 3-dha-epa-fish oil (Fish OiL) 1,200 (144-216) mg capsule Take by mouth.    omeprazole (PriLOSEC) 40 mg DR capsule 1 BY MOUTH ONCE DAILY ( INCREASED FROM 20MG ON 6/9/23)    predniSONE (Deltasone) 10 mg tablet TAKE ORALLY: 4 TABLETS DAILY FOR 3 DAYS, 3 TABLETS DAILY FOR 2 DAYS, 2 TABLETS DAILY FOR 2 DAYS AND 1 TABLET DAILY FOR 2 DAYS, THEN STOP.    rosuvastatin (Crestor) 20 mg tablet Daily    sod chloride-sodium bicarb (Neilmed Sinus Rinse Complete) nasal rinse Irrigate your nose per instructions twice daily    tamsulosin (FLOMAX) 0.4 mg, Nightly    zinc gluconate 50 mg tablet Every 24 hours        Allergies   Allergen Reactions    Atorvastatin Other     Muscle aches    Clarithromycin Unknown     Extreme Stomach Upset    Penicillins Unknown        Review of Systems  A detailed 12 point ROS was performed and is negative except as noted in the intake form, HPI and/or Past Medical History    Physical Exam   CONSTITUTIONAL: Well developed, well nourished.  VOICE: Normal voice quality  RESPIRATION: Breathing comfortably, no stridor.  CV: No clubbing/cyanosis/edema in hands.  EYES: EOM Intact, sclera normal.  NEURO: Alert and oriented times 3, Cranial nerves V,VII intact and symmetric bilaterally.  HEAD AND FACE: Symmetric facial features,  no masses or lesions, sinuses nontender to palpation.  SALIVARY GLANDS: Parotid and submandibular glands normal bilaterally.  EARS: Normal external ears, external auditory canals, and TMs to otoscopy  NOSE: External nose midline, anterior rhinoscopy is normal with limited visualization to the anterior aspect of the interior turbinates. No lesions noted.  ORAL CAVITY/OROPHARYNX/LIPS: Normal mucous membranes, normal floor of mouth/tongue/OP, no masses or lesions are noted.  PHARYNGEAL WALLS AND NASOPHARYNX: No masses noted. Mucosa appears clean and moist  NECK/LYMPH: No LAD, no thyroid masses. Trachea palpably midline  SKIN: Neck skin is without injury  PSYCH: Alert and oriented with appropriate mood and affect     Nasal endoscopy:  PROCEDURE NOTE    For better visualization because of septal deviation, turbinate hypertrophy nasal endoscopy was performed after verbal consent was obtained by the patient and/or guardian. Both nostrils were sprayed with a mixture of lidocaine 4% and Afrin. After a sufficient amount of time elapsed for mucosal anesthesia to take place, the nasal endoscope was advanced into the nostril.    The following areas were visualized:  Nasal passage, nasal septum, turbinates, middle meatus, nasopharynx, sinus ostia    The patient tolerated the procedure well and these structures were found to be normal except as follows:  Bilateral inferior turbinate hypertrophy  Septum mostly ML, slight left dorsal deviation  Bilateral generalized mucosal edema  Right: Maxillary antrostomy patent, ethmoid region with surgical changes, polyps noted along the right middle meatus and sphenoethmoid recess. No mucopurulence, nor masses.  Left: Maxillary antrostomy patent, ethmoid region with surgical changes. Polyps noted within the left middle meatus and sphenoethmoid recess. No mucopurulence, nor masses.      Assessment  Chronic rhinosinusitis with nasal polyps   Facial pressure  Nasal drainage  Nasal airway  obstruction  Bilateral inferior turbinate hypertrophy  Remote history of sinonasal surgery    Plan  The patient and I discussed their current nasal / sinus symptoms as well as several therapeutic options.  Reports significant improvement of his sinonasal symptomatology, currently has no complaints.  Previously visualized mucopurulence has resolved following systemic antibiotics.   Multiple treatment alternatives discussed, at this time he would like to defer surgical options and proceed with medical management.  He will continue his current nasal hygiene/ medical regimen consisting of saline/mometasone irrigations.     The patient was instructed on the correct technique to administer the topical saline/mometasone mix.  I stressed consistency of usage for maximal benefit. We discussed the rationale for the timing of this therapy and the benefits and potential adverse effects.      RTC 4m

## 2025-06-26 ENCOUNTER — TELEPHONE (OUTPATIENT)
Dept: PRIMARY CARE | Facility: CLINIC | Age: 85
End: 2025-06-26
Payer: MEDICARE

## 2025-06-26 NOTE — TELEPHONE ENCOUNTER
Patient asking about CT RESULTS - I see the results but I do not believe you have reviewed.  Thanks.    [Normocephalic] : normocephalic [EOMI] : extra ocular movement intact [No Supraclavicular Adenopathy] : no supraclavicular adenopathy [No Cervical Adenopathy] : no cervical adenopathy [Examined in the supine and seated position] : examined in the supine and seated position [No dominant masses] : no dominant masses in right breast  [No dominant masses] : no dominant masses left breast [No Nipple Retraction] : no left nipple retraction [No Nipple Discharge] : no left nipple discharge [No Axillary Lymphadenopathy] : no left axillary lymphadenopathy [No Rashes] : no rashes [No Ulceration] : no ulceration [de-identified] : nL respirations

## 2025-07-08 ENCOUNTER — APPOINTMENT (OUTPATIENT)
Dept: PRIMARY CARE | Facility: CLINIC | Age: 85
End: 2025-07-08
Payer: MEDICARE

## 2025-07-08 VITALS
RESPIRATION RATE: 16 BRPM | WEIGHT: 165 LBS | HEIGHT: 67 IN | SYSTOLIC BLOOD PRESSURE: 118 MMHG | DIASTOLIC BLOOD PRESSURE: 74 MMHG | BODY MASS INDEX: 25.9 KG/M2 | HEART RATE: 56 BPM | TEMPERATURE: 97.5 F

## 2025-07-08 DIAGNOSIS — R73.9 HYPERGLYCEMIA: ICD-10-CM

## 2025-07-08 DIAGNOSIS — G20.B1 PARKINSON'S DISEASE WITH DYSKINESIA WITHOUT FLUCTUATING MANIFESTATIONS: ICD-10-CM

## 2025-07-08 DIAGNOSIS — K21.9 GASTROESOPHAGEAL REFLUX DISEASE, UNSPECIFIED WHETHER ESOPHAGITIS PRESENT: ICD-10-CM

## 2025-07-08 DIAGNOSIS — Z00.00 PHYSICAL EXAM: ICD-10-CM

## 2025-07-08 DIAGNOSIS — K59.00 CONSTIPATION, UNSPECIFIED CONSTIPATION TYPE: ICD-10-CM

## 2025-07-08 DIAGNOSIS — G89.29 CHRONIC MIDLINE LOW BACK PAIN WITHOUT SCIATICA: ICD-10-CM

## 2025-07-08 DIAGNOSIS — J32.9 CHRONIC SINUSITIS, UNSPECIFIED LOCATION: ICD-10-CM

## 2025-07-08 DIAGNOSIS — R53.83 OTHER FATIGUE: ICD-10-CM

## 2025-07-08 DIAGNOSIS — Z12.5 PROSTATE CANCER SCREENING: ICD-10-CM

## 2025-07-08 DIAGNOSIS — I25.10 CORONARY ARTERY DISEASE DUE TO LIPID RICH PLAQUE: ICD-10-CM

## 2025-07-08 DIAGNOSIS — Z00.00 ENCOUNTER FOR ANNUAL WELLNESS VISIT (AWV) IN MEDICARE PATIENT: Primary | ICD-10-CM

## 2025-07-08 DIAGNOSIS — I25.83 CORONARY ARTERY DISEASE DUE TO LIPID RICH PLAQUE: ICD-10-CM

## 2025-07-08 DIAGNOSIS — M54.50 CHRONIC MIDLINE LOW BACK PAIN WITHOUT SCIATICA: ICD-10-CM

## 2025-07-08 DIAGNOSIS — I71.40 ABDOMINAL AORTIC ANEURYSM (AAA) WITHOUT RUPTURE, UNSPECIFIED PART: ICD-10-CM

## 2025-07-08 DIAGNOSIS — E78.5 HYPERLIPIDEMIA, UNSPECIFIED HYPERLIPIDEMIA TYPE: ICD-10-CM

## 2025-07-08 DIAGNOSIS — G47.33 OBSTRUCTIVE SLEEP APNEA SYNDROME: ICD-10-CM

## 2025-07-08 DIAGNOSIS — E78.00 PURE HYPERCHOLESTEROLEMIA: ICD-10-CM

## 2025-07-08 PROCEDURE — 3078F DIAST BP <80 MM HG: CPT | Performed by: FAMILY MEDICINE

## 2025-07-08 PROCEDURE — 3074F SYST BP LT 130 MM HG: CPT | Performed by: FAMILY MEDICINE

## 2025-07-08 PROCEDURE — G0439 PPPS, SUBSEQ VISIT: HCPCS | Performed by: FAMILY MEDICINE

## 2025-07-08 PROCEDURE — 1123F ACP DISCUSS/DSCN MKR DOCD: CPT | Performed by: FAMILY MEDICINE

## 2025-07-08 PROCEDURE — 99397 PER PM REEVAL EST PAT 65+ YR: CPT | Performed by: FAMILY MEDICINE

## 2025-07-08 PROCEDURE — 1159F MED LIST DOCD IN RCRD: CPT | Performed by: FAMILY MEDICINE

## 2025-07-08 PROCEDURE — 1170F FXNL STATUS ASSESSED: CPT | Performed by: FAMILY MEDICINE

## 2025-07-08 PROCEDURE — 1036F TOBACCO NON-USER: CPT | Performed by: FAMILY MEDICINE

## 2025-07-08 RX ORDER — OMEPRAZOLE 40 MG/1
40 CAPSULE, DELAYED RELEASE ORAL 2 TIMES DAILY
Qty: 60 CAPSULE | Refills: 1 | Status: SHIPPED | OUTPATIENT
Start: 2025-07-08 | End: 2025-09-06

## 2025-07-08 ASSESSMENT — ACTIVITIES OF DAILY LIVING (ADL)
MANAGING_FINANCES: INDEPENDENT
GROCERY_SHOPPING: INDEPENDENT
BATHING: INDEPENDENT
TAKING_MEDICATION: INDEPENDENT
DOING_HOUSEWORK: INDEPENDENT
DRESSING: INDEPENDENT

## 2025-07-08 NOTE — ASSESSMENT & PLAN NOTE
On and off   No acute changes.   Orders:    CBC and Auto Differential; Future    Comprehensive Metabolic Panel; Future    Hemoglobin A1C; Future    Lipid Panel; Future    Prostate Specific Antigen; Future    TSH with reflex to Free T4 if abnormal; Future    Urinalysis with Reflex Culture and Microscopic; Future

## 2025-07-08 NOTE — ASSESSMENT & PLAN NOTE
The ear nose Dr. Carlos   He takes mometasone daily once   Daily and allergy   No problems since.   Orders:    CBC and Auto Differential; Future    Comprehensive Metabolic Panel; Future    Hemoglobin A1C; Future    Lipid Panel; Future    Prostate Specific Antigen; Future    TSH with reflex to Free T4 if abnormal; Future    Urinalysis with Reflex Culture and Microscopic; Future

## 2025-07-08 NOTE — ASSESSMENT & PLAN NOTE
Orders:    CBC and Auto Differential; Future    Comprehensive Metabolic Panel; Future    Hemoglobin A1C; Future    Lipid Panel; Future    Prostate Specific Antigen; Future    TSH with reflex to Free T4 if abnormal; Future    Urinalysis with Reflex Culture and Microscopic; Future

## 2025-07-08 NOTE — ASSESSMENT & PLAN NOTE
Following with neurology   Getting a little worse, getting some symptosm int he left hand.   Could not tolerate bthe gabapeinthn   Orders:    CBC and Auto Differential; Future    Comprehensive Metabolic Panel; Future    Hemoglobin A1C; Future    Lipid Panel; Future    Prostate Specific Antigen; Future    TSH with reflex to Free T4 if abnormal; Future    Urinalysis with Reflex Culture and Microscopic; Future

## 2025-07-08 NOTE — ASSESSMENT & PLAN NOTE
[facility]   Orders:    CBC and Auto Differential; Future    Comprehensive Metabolic Panel; Future    Hemoglobin A1C; Future    Lipid Panel; Future    Prostate Specific Antigen; Future    TSH with reflex to Free T4 if abnormal; Future    Urinalysis with Reflex Culture and Microscopic; Future

## 2025-07-08 NOTE — ASSESSMENT & PLAN NOTE
Uses CPAP it helps   Orders:    CBC and Auto Differential; Future    Comprehensive Metabolic Panel; Future    Hemoglobin A1C; Future    Lipid Panel; Future    Prostate Specific Antigen; Future    TSH with reflex to Free T4 if abnormal; Future    Urinalysis with Reflex Culture and Microscopic; Future

## 2025-07-08 NOTE — PROGRESS NOTES
Subjective   Patient ID: Luis Armando Malin is a 84 y.o. male who presents for Medicare Annual Wellness Visit Subsequent.  HPI  PATIENT HAS BEEN HAVING ABDOMINAL Pain   NO CONSTIPATION   BOWEL MVT EASES IT UP SOME.   THE PAIN IS DULL ACHE , NOT IN ONE PARTICULAR AREA, WHOLE STOMACH HURTS.   Impression:              - Medium-sized hiatal hernia.                            - Normal mucosa was found in the entire esophagus.                            - Erosive gastropathy with no bleeding and no                            stigmata of recent bleeding. Biopsied.                            - Erythematous mucosa in the antrum.                            - Normal.                            - Multiple gastric polyps. Biopsied.   Recommendation:          - Resume previous diet.                            - Await pathology results.                            - Avoiud NSAIDS if possible                            - Use Prilosec (omeprazole) 40 mg PO daily.                            - Return to GI office in 2 months.                            - The patient is not currently taking                            anticoagulant or antiplatelet agents except for                            aspirin.       DID NOT EAT ANY THING TODAY FOR BLOOD TESTING.   Patient presenting for AWV   Patient is a  with hx of DVT , treated with eliquis , completed treatment., CAD post stenting 20 years ago , PARKINSON's Disease    Saw urologist and every thing was fine Dr. Lutz   Saw his cardiologist for cardiac stnet 20 years ago Dr. Luan Britton [facility]   Neurologist Dr. Grant is his neurologist with [facility]       he had CT scan last year for the abdomen and was reassuring      [facility]     usually wears a hearing aid.      he had colonoscopy 2021 and no indication to repeat due to age.         Patient was instructed on regular exercise, watching low fat diet, and getting the proper amount of sleep.   "Screening tests appropriate to the age group were discussed including colon cancer screening, mammography and pap (if female), prostate testing ( if male), as well as bone density testing in the appropriate age group. Reviewed with patient the recommended immunization schedule.     PREVENTATIVE ISSUES REVIEWED    REVIEWED VACCINATIONS   ENCOURAGE HEALTHY EATING AND REGULAR EXERCISE   ENCOURAGE SLEEPING 7-8 HOURS AT NIGHT AND STAYING WELL HYDRATED     COLONOSCOPY DONE 2021 and [facility] recommended no further testing due to age    LABS YEARLY AND DONE RECENTLY AND REVIEWED                                EYE CHECK YEARLY       URINE GLADYS IS GOOD , SAW UROLOGIST 1 MONTH AGO .      MIRALAX , METAMUCIL , PRUNE JUICE CONTROLS HIS CONSTIPATION .      HE IS IS GOOD SPIRITS.      [facility]      Review of Systems    Medical History[1]    Surgical History[2]   Social History[3]   Family History[4]    MEDICATIONS AND ALLERGIES:    ALLERGIES Atorvastatin, Clarithromycin, and Penicillins    MEDICATIONS   Medications Ordered Prior to Encounter[5]           Objective   Visit Vitals  /74   Pulse 56   Temp 36.4 °C (97.5 °F) (Temporal)   Resp 16   Ht 1.702 m (5' 7\")   Wt 74.8 kg (165 lb)   BMI 25.84 kg/m²   Smoking Status Former   BSA 1.88 m²          11/27/2024     1:49 PM 12/23/2024    10:02 AM 1/7/2025     1:05 PM 2/24/2025     1:57 PM 3/14/2025     1:18 PM 4/7/2025    10:32 AM 7/8/2025    12:57 PM   Vitals   Systolic 135 128 130 146 128  118   Diastolic 67 64 70 77 78  74   BP Location   Left arm  Left arm     Heart Rate 57 75 72 65 63  56   Temp 36.6 °C (97.8 °F) 36.5 °C (97.7 °F) 36.2 °C (97.1 °F) 36.6 °C (97.8 °F)   36.4 °C (97.5 °F)   Resp   18    16   Height 1.702 m (5' 7\")  1.702 m (5' 7\") 1.702 m (5' 7\") 1.702 m (5' 7\") 1.702 m (5' 7\") 1.702 m (5' 7\")   Weight (lb) 174 174 175  170 170 165   BMI 27.25 kg/m2 27.25 kg/m2 27.41 kg/m2 27.41 kg/m2 26.63 kg/m2 26.63 kg/m2 25.84 kg/m2   BSA (m2) " 1.93 m2 1.93 m2 1.94 m2 1.94 m2 1.91 m2 1.91 m2 1.88 m2   Visit Report Report Report Report Report Report Report Report     Physical Exam        Assessment & Plan  Encounter for annual wellness visit (AWV) in Medicare patient  Risk Factors Identified During Visit: multiple medical conditions , including parkinson , hx of blood clots.   Influenza:  yearly   Pneumovax 23:UTD  Prevnar: UTD  Shingles Vaccine: UTD  Prostate cancer screening: following with urology   Colorectal Cancer Screenin , no indication to repeat due to age   Abdominal Aortic Aneurysm screening: CT scan from  no aneurysm   Code status :  full code.     Ana Malin (Other)  [phone] ([place])   She is his wife.        Orders:  •  CBC and Auto Differential; Future  •  Comprehensive Metabolic Panel; Future  •  Hemoglobin A1C; Future  •  Lipid Panel; Future  •  Prostate Specific Antigen; Future  •  TSH with reflex to Free T4 if abnormal; Future  •  Urinalysis with Reflex Culture and Microscopic; Future    Abdominal aortic aneurysm (AAA) without rupture, unspecified part   PATIENT SAW rhonda CRAIG 2024   VASCULAR SURGERY       reviewed imaging with patient in detail   - Reviewed with pt AAA, as well as natural history and progression ,indications for repair, types of repair as well as r/b/a.  Discussed with pt that since the saccular aneurysm is very small and has been stable in size since  would recommend conservative management with continued surveillance imaging yearly  -If saccular aneurysm ever changes in size or morphology would recommend repair at that time given the elevated risk of rupture of saccular aneurysm compared to fusiform aneurysm  -Will plan repeat CTA abdomen pelvis in 1 year with follow-up after              I spent a total of 45 minutes on the date of the service which included preparing to see the patient, face-to-face patient care, completing clinical documentation, obtaining and/or reviewing  separately obtained history, performing a medically appropriate examination, counseling and educating the patient/family/caregiver, communicating with other HCPs (not separately reported), independently interpreting results (not separately reported) and communicating results to the patient/family/caregiver.        SIGNATURE: Kristan Houston DO PATIENT NAME: Luis Armando Malin       Orders:  •  CBC and Auto Differential; Future  •  Comprehensive Metabolic Panel; Future  •  Hemoglobin A1C; Future  •  Lipid Panel; Future  •  Prostate Specific Antigen; Future  •  TSH with reflex to Free T4 if abnormal; Future  •  Urinalysis with Reflex Culture and Microscopic; Future  •  CT angio abdomen pelvis w and or wo IV IV contrast; Future    Parkinson's disease with dyskinesia without fluctuating manifestations  Following with neurology   Getting a little worse, getting some symptosm int he left hand.   Could not tolerate bthe gabapeinthn   Orders:  •  CBC and Auto Differential; Future  •  Comprehensive Metabolic Panel; Future  •  Hemoglobin A1C; Future  •  Lipid Panel; Future  •  Prostate Specific Antigen; Future  •  TSH with reflex to Free T4 if abnormal; Future  •  Urinalysis with Reflex Culture and Microscopic; Future    Coronary artery disease due to lipid rich plaque    Orders:  •  CBC and Auto Differential; Future  •  Comprehensive Metabolic Panel; Future  •  Hemoglobin A1C; Future  •  Lipid Panel; Future  •  Prostate Specific Antigen; Future  •  TSH with reflex to Free T4 if abnormal; Future  •  Urinalysis with Reflex Culture and Microscopic; Future    Chronic sinusitis, unspecified location  The ear nose Dr. Carlos   He takes mometasone daily once   Daily and allergy   No problems since.   Orders:  •  CBC and Auto Differential; Future  •  Comprehensive Metabolic Panel; Future  •  Hemoglobin A1C; Future  •  Lipid Panel; Future  •  Prostate Specific Antigen; Future  •  TSH with reflex to Free T4 if abnormal; Future  •   Urinalysis with Reflex Culture and Microscopic; Future    Obstructive sleep apnea syndrome  Uses CPAP it helps   Orders:  •  CBC and Auto Differential; Future  •  Comprehensive Metabolic Panel; Future  •  Hemoglobin A1C; Future  •  Lipid Panel; Future  •  Prostate Specific Antigen; Future  •  TSH with reflex to Free T4 if abnormal; Future  •  Urinalysis with Reflex Culture and Microscopic; Future    Hyperlipidemia, unspecified hyperlipidemia type    Orders:  •  CBC and Auto Differential; Future  •  Comprehensive Metabolic Panel; Future  •  Hemoglobin A1C; Future  •  Lipid Panel; Future  •  Prostate Specific Antigen; Future  •  TSH with reflex to Free T4 if abnormal; Future  •  Urinalysis with Reflex Culture and Microscopic; Future    Constipation, unspecified constipation type  On and off   No acute changes.   Orders:  •  CBC and Auto Differential; Future  •  Comprehensive Metabolic Panel; Future  •  Hemoglobin A1C; Future  •  Lipid Panel; Future  •  Prostate Specific Antigen; Future  •  TSH with reflex to Free T4 if abnormal; Future  •  Urinalysis with Reflex Culture and Microscopic; Future    Other fatigue  Chornic   Orders:  •  CBC and Auto Differential; Future  •  Comprehensive Metabolic Panel; Future  •  Hemoglobin A1C; Future  •  Lipid Panel; Future  •  Prostate Specific Antigen; Future  •  TSH with reflex to Free T4 if abnormal; Future  •  Urinalysis with Reflex Culture and Microscopic; Future    Pure hypercholesterolemia    Orders:  •  CBC and Auto Differential; Future  •  Comprehensive Metabolic Panel; Future  •  Hemoglobin A1C; Future  •  Lipid Panel; Future  •  Prostate Specific Antigen; Future  •  TSH with reflex to Free T4 if abnormal; Future  •  Urinalysis with Reflex Culture and Microscopic; Future    Prostate cancer screening  Will check PSA folows with dR. Kebede   Orders:  •  CBC and Auto Differential; Future  •  Comprehensive Metabolic Panel; Future  •  Hemoglobin A1C; Future  •  Lipid Panel;  Future  •  Prostate Specific Antigen; Future  •  TSH with reflex to Free T4 if abnormal; Future  •  Urinalysis with Reflex Culture and Microscopic; Future    Physical exam    Orders:  •  CBC and Auto Differential; Future  •  Comprehensive Metabolic Panel; Future  •  Hemoglobin A1C; Future  •  Lipid Panel; Future  •  Prostate Specific Antigen; Future  •  TSH with reflex to Free T4 if abnormal; Future  •  Urinalysis with Reflex Culture and Microscopic; Future    Hyperglycemia    Orders:  •  Hemoglobin A1C; Future    Gastroesophageal reflux disease, unspecified whether esophagitis present    Orders:  •  omeprazole (PriLOSEC) 40 mg DR capsule; Take 1 capsule (40 mg) by mouth 2 times a day. 1 BY MOUTH ONCE DAILY ( INCREASED FROM 20MG ON 6/9/23)    Chronic midline low back pain without sciatica  Will reofer to ortho   Chornic finding per patient.     Orders:  •  Referral to Orthopedics and Sports Medicine; Future                        [1]  Past Medical History:  Diagnosis Date   • Heart disease, unspecified     Heart problem   • Personal history of other diseases of the nervous system and sense organs     History of Parkinson's disease   • Personal history of other endocrine, nutritional and metabolic disease     History of high cholesterol   [2]  Past Surgical History:  Procedure Laterality Date   • OTHER SURGICAL HISTORY  06/25/2020    Hernia repair   • OTHER SURGICAL HISTORY  06/25/2020    Gallbladder surgery   • OTHER SURGICAL HISTORY  06/25/2020    Shoulder surgery   • OTHER SURGICAL HISTORY  06/25/2020    Sinus surgery   • OTHER SURGICAL HISTORY  06/25/2020    Elbow surgery   [3]  Social History  Socioeconomic History   • Marital status:    Tobacco Use   • Smoking status: Former     Types: Cigarettes     Passive exposure: Past   • Smokeless tobacco: Never   Vaping Use   • Vaping status: Never Used   Substance and Sexual Activity   • Alcohol use: Yes   • Drug use: Never   • Sexual activity: Defer    [4]  Family History  Problem Relation Name Age of Onset   • Cancer Father     [5]  Current Outpatient Medications on File Prior to Visit   Medication Sig Dispense Refill   • aspirin 81 mg EC tablet Take by mouth once daily.     • carbidopa-levodopa (Sinemet)  mg tablet Take 1.5 tablets by mouth 4 times a day. 180 tablet 0   • loratadine (Claritin) 10 mg tablet TAKE 1 TABLET BY MOUTH EVERY DAY 90 tablet 1   • mirtazapine (Remeron) 15 mg tablet Take 1/2 Pill AT Bedtime For 2 Weeks Then Increase To A Full Pill AT Bedtime     • rosuvastatin (Crestor) 20 mg tablet Take by mouth once daily.     • sod chloride-sodium bicarb (Neilmed Sinus Rinse Complete) nasal rinse Irrigate your nose per instructions twice daily 1 each 3   • tamsulosin (Flomax) 0.4 mg 24 hr capsule Take 1 capsule (0.4 mg) by mouth once daily at bedtime.     • [DISCONTINUED] omeprazole (PriLOSEC) 40 mg DR capsule 1 BY MOUTH ONCE DAILY ( INCREASED FROM 20MG ON 6/9/23) 90 capsule 3   • ascorbic acid, vitamin C, 500 mg capsule Take by mouth.     • fluticasone propion-salmeteroL (Advair Diskus) 250-50 mcg/dose diskus inhaler Inhale 1 puff 2 times a day. Rinse mouth with water after use to reduce aftertaste and incidence of candidiasis. Do not swallow. 60 each 0   • multivitamin with minerals (Adults Multivitamin) tablet Take by mouth.     • omega 3-dha-epa-fish oil (Fish OiL) 1,200 (144-216) mg capsule Take by mouth.     • zinc gluconate 50 mg tablet once every 24 hours.     • [DISCONTINUED] albuterol 90 mcg/actuation inhaler Inhale 2 puffs every 6 hours if needed for wheezing. (Patient not taking: Reported on 7/8/2025) 18 g 11   • [DISCONTINUED] predniSONE (Deltasone) 10 mg tablet TAKE ORALLY: 4 TABLETS DAILY FOR 3 DAYS, 3 TABLETS DAILY FOR 2 DAYS, 2 TABLETS DAILY FOR 2 DAYS AND 1 TABLET DAILY FOR 2 DAYS, THEN STOP. (Patient not taking: Reported on 7/8/2025) 24 tablet 0     No current facility-administered medications on file prior to visit.

## 2025-07-09 LAB
ALBUMIN SERPL-MCNC: 4.3 G/DL (ref 3.6–5.1)
ALP SERPL-CCNC: 67 U/L (ref 35–144)
ALT SERPL-CCNC: 8 U/L (ref 9–46)
ANION GAP SERPL CALCULATED.4IONS-SCNC: 9 MMOL/L (CALC) (ref 7–17)
APPEARANCE UR: CLEAR
AST SERPL-CCNC: 16 U/L (ref 10–35)
BACTERIA #/AREA URNS HPF: ABNORMAL /HPF
BACTERIA UR CULT: ABNORMAL
BASOPHILS # BLD AUTO: 36 CELLS/UL (ref 0–200)
BASOPHILS NFR BLD AUTO: 0.4 %
BILIRUB SERPL-MCNC: 0.9 MG/DL (ref 0.2–1.2)
BILIRUB UR QL STRIP: NEGATIVE
BUN SERPL-MCNC: 13 MG/DL (ref 7–25)
CALCIUM SERPL-MCNC: 9.2 MG/DL (ref 8.6–10.3)
CHLORIDE SERPL-SCNC: 103 MMOL/L (ref 98–110)
CHOLEST SERPL-MCNC: 126 MG/DL
CHOLEST/HDLC SERPL: 2.7 (CALC)
CO2 SERPL-SCNC: 27 MMOL/L (ref 20–32)
COLOR UR: ABNORMAL
CREAT SERPL-MCNC: 1.09 MG/DL (ref 0.7–1.22)
EGFRCR SERPLBLD CKD-EPI 2021: 67 ML/MIN/1.73M2
EOSINOPHIL # BLD AUTO: 567 CELLS/UL (ref 15–500)
EOSINOPHIL NFR BLD AUTO: 6.3 %
ERYTHROCYTE [DISTWIDTH] IN BLOOD BY AUTOMATED COUNT: 13.7 % (ref 11–15)
EST. AVERAGE GLUCOSE BLD GHB EST-MCNC: 111 MG/DL
EST. AVERAGE GLUCOSE BLD GHB EST-SCNC: 6.2 MMOL/L
GLUCOSE SERPL-MCNC: 84 MG/DL (ref 65–99)
GLUCOSE UR QL STRIP: NEGATIVE
HBA1C MFR BLD: 5.5 %
HCT VFR BLD AUTO: 48.1 % (ref 38.5–50)
HDLC SERPL-MCNC: 47 MG/DL
HGB BLD-MCNC: 15.4 G/DL (ref 13.2–17.1)
HGB UR QL STRIP: NEGATIVE
HYALINE CASTS #/AREA URNS LPF: ABNORMAL /LPF
KETONES UR QL STRIP: ABNORMAL
LDLC SERPL CALC-MCNC: 60 MG/DL (CALC)
LEUKOCYTE ESTERASE UR QL STRIP: NEGATIVE
LYMPHOCYTES # BLD AUTO: 1638 CELLS/UL (ref 850–3900)
LYMPHOCYTES NFR BLD AUTO: 18.2 %
MCH RBC QN AUTO: 29.4 PG (ref 27–33)
MCHC RBC AUTO-ENTMCNC: 32 G/DL (ref 32–36)
MCV RBC AUTO: 91.8 FL (ref 80–100)
MONOCYTES # BLD AUTO: 702 CELLS/UL (ref 200–950)
MONOCYTES NFR BLD AUTO: 7.8 %
NEUTROPHILS # BLD AUTO: 6057 CELLS/UL (ref 1500–7800)
NEUTROPHILS NFR BLD AUTO: 67.3 %
NITRITE UR QL STRIP: NEGATIVE
NONHDLC SERPL-MCNC: 79 MG/DL (CALC)
PH UR STRIP: 5.5 [PH] (ref 5–8)
PLATELET # BLD AUTO: 175 THOUSAND/UL (ref 140–400)
PMV BLD REES-ECKER: 10.6 FL (ref 7.5–12.5)
POTASSIUM SERPL-SCNC: 4.2 MMOL/L (ref 3.5–5.3)
PROT SERPL-MCNC: 6.9 G/DL (ref 6.1–8.1)
PROT UR QL STRIP: NEGATIVE
PSA SERPL-MCNC: 3.84 NG/ML
RBC # BLD AUTO: 5.24 MILLION/UL (ref 4.2–5.8)
RBC #/AREA URNS HPF: ABNORMAL /HPF
SERVICE CMNT-IMP: ABNORMAL
SODIUM SERPL-SCNC: 139 MMOL/L (ref 135–146)
SP GR UR STRIP: 1.02 (ref 1–1.03)
SQUAMOUS #/AREA URNS HPF: ABNORMAL /HPF
TRIGL SERPL-MCNC: 103 MG/DL
TSH SERPL-ACNC: 0.87 MIU/L (ref 0.4–4.5)
WBC # BLD AUTO: 9 THOUSAND/UL (ref 3.8–10.8)
WBC #/AREA URNS HPF: ABNORMAL /HPF

## 2025-07-14 ENCOUNTER — TELEPHONE (OUTPATIENT)
Dept: PRIMARY CARE | Facility: CLINIC | Age: 85
End: 2025-07-14
Payer: MEDICARE

## 2025-07-14 NOTE — TELEPHONE ENCOUNTER
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   PSA 3.84  Eosinophils slightly elevated, could indicate allergies.   Otherwise labs helder the normal range.   ----- Message -----  From: e-Tag Results In  Sent: 7/9/2025   5:14 AM EDT  To: Jeff Mata MD

## 2025-07-18 ENCOUNTER — HOSPITAL ENCOUNTER (OUTPATIENT)
Dept: RADIOLOGY | Facility: CLINIC | Age: 85
Discharge: HOME | End: 2025-07-18
Payer: MEDICARE

## 2025-07-18 DIAGNOSIS — I71.40 ABDOMINAL AORTIC ANEURYSM (AAA) WITHOUT RUPTURE, UNSPECIFIED PART: ICD-10-CM

## 2025-07-18 PROCEDURE — 2550000001 HC RX 255 CONTRASTS: Performed by: FAMILY MEDICINE

## 2025-07-18 PROCEDURE — 74174 CTA ABD&PLVS W/CONTRAST: CPT

## 2025-07-18 RX ADMIN — IOHEXOL 100 ML: 350 INJECTION, SOLUTION INTRAVENOUS at 12:57

## 2025-07-21 ENCOUNTER — RESULTS FOLLOW-UP (OUTPATIENT)
Dept: PRIMARY CARE | Facility: CLINIC | Age: 85
End: 2025-07-21
Payer: MEDICARE

## 2025-07-22 ENCOUNTER — TELEPHONE (OUTPATIENT)
Dept: PRIMARY CARE | Facility: CLINIC | Age: 85
End: 2025-07-22
Payer: MEDICARE

## 2025-07-22 NOTE — TELEPHONE ENCOUNTER
----- Message from Jeff Mata sent at 7/21/2025 11:27 PM EDT -----  Seems to be stable, lets schedule a follow up visit to discuss in details, also schedule with pola kendall for follow up .   ----- Message -----  From: Interface, Radiology Results In  Sent: 7/19/2025   3:41 PM EDT  To: Jeff Mata MD

## 2025-07-30 DIAGNOSIS — K21.9 GASTROESOPHAGEAL REFLUX DISEASE, UNSPECIFIED WHETHER ESOPHAGITIS PRESENT: ICD-10-CM

## 2025-07-30 RX ORDER — OMEPRAZOLE 40 MG/1
40 CAPSULE, DELAYED RELEASE ORAL 2 TIMES DAILY
Qty: 180 CAPSULE | Refills: 0 | Status: SHIPPED | OUTPATIENT
Start: 2025-07-30

## 2025-08-04 ENCOUNTER — APPOINTMENT (OUTPATIENT)
Facility: CLINIC | Age: 85
End: 2025-08-04
Payer: MEDICARE

## 2025-08-04 DIAGNOSIS — M54.16 LUMBAR RADICULOPATHY: Primary | ICD-10-CM

## 2025-08-04 PROCEDURE — 99204 OFFICE O/P NEW MOD 45 MIN: CPT | Performed by: PHYSICIAN ASSISTANT

## 2025-08-04 PROCEDURE — 1160F RVW MEDS BY RX/DR IN RCRD: CPT | Performed by: PHYSICIAN ASSISTANT

## 2025-08-04 PROCEDURE — 1159F MED LIST DOCD IN RCRD: CPT | Performed by: PHYSICIAN ASSISTANT

## 2025-08-04 NOTE — PROGRESS NOTES
Chief Complaint: Chronic low back pain    HPI: Luis Armando Malin is a 85 y.o. male patient presenting with dull low back/hip pain that has gotten worse and is now radiating to the lower extremities posteriorly. He explains that the back/hip pain has been going on for 1 year but the right leg pain started 3 weeks ago. The left leg pain started about 4 days ago. The pain has been intermittent and gets worse with standing and bending over. He has not taken any medication for the pain. He endorses some weakness and numbness/tingling in his legs but goes away when he sits down. He denies any changes in gait, trauma to the spine, falling, and bladder/bowel dysfunction.    He was diagnosed with Parkinson's approximately 20 years ago.  No anticoagulations  Negative tobacco use    Review of Systems    All other systems have been reviewed and are negative for complaint. All pertinent positive and negative as listed in history of present illness.    Medical History[1]     Surgical History[2]     RX Allergies[3]     Medications Ordered Prior to Encounter[4]       PE:   Const: Well-appearing, well-nourished male in no distress.  Eyes: Normal appearing sclera and conjunctiva, no jaundice, pupils normal in appearance.  Resp: breathing comfortably, normal respiratory rate.  CV: No upper or lower extremity edema.  Musculoskeletal: Balance gait. Lumbar ROM is supple.  Strength exam of the lower extremities reveals 5/5 strength in all major muscle groups.  Negative straight leg raise bilaterally.  Neuro: Sensation is intact and equal bilaterally. Deep tendon reflexes are normal and symmetric.  No clonus.  Resting tremor in bilateral upper extremities.  Skin: Intact without any lesions, normal turgor.  Psych: Alert and oriented x3, normal mood and affect.        Imaging:    I personally reviewed a CT of the abdomen and pelvis from 7/18.  There is no evidence of acute fracture or instability of the lumbar spine.  Mild degenerative  changes.      A/P:    The plan is further evaluate his chronic worsening low back pain with new onset of bilateral radiculopathy.  An MRI of the lumbar spine was ordered today.  This medically necessary to assess for lumbar stenosis.  In the meantime he was given a referral for physical therapy.  He can follow-up with me once the MRI is complete to discuss further treatment options, possibly including injections versus surgical intervention.    **This note was dictated using speech recognition software and was not corrected for spelling or grammatical errors**                                                                           [1]   Past Medical History:  Diagnosis Date    Heart disease, unspecified     Heart problem    Personal history of other diseases of the nervous system and sense organs     History of Parkinson's disease    Personal history of other endocrine, nutritional and metabolic disease     History of high cholesterol   [2]   Past Surgical History:  Procedure Laterality Date    OTHER SURGICAL HISTORY  06/25/2020    Hernia repair    OTHER SURGICAL HISTORY  06/25/2020    Gallbladder surgery    OTHER SURGICAL HISTORY  06/25/2020    Shoulder surgery    OTHER SURGICAL HISTORY  06/25/2020    Sinus surgery    OTHER SURGICAL HISTORY  06/25/2020    Elbow surgery   [3]   Allergies  Allergen Reactions    Atorvastatin Other     Muscle aches    Clarithromycin Unknown     Extreme Stomach Upset    Penicillins Unknown   [4]   Current Outpatient Medications on File Prior to Visit   Medication Sig Dispense Refill    ascorbic acid, vitamin C, 500 mg capsule Take by mouth.      aspirin 81 mg EC tablet Take by mouth once daily.      carbidopa-levodopa (Sinemet)  mg tablet Take 1.5 tablets by mouth 4 times a day. 180 tablet 0    fluticasone propion-salmeteroL (Advair Diskus) 250-50 mcg/dose diskus inhaler Inhale 1 puff 2 times a day. Rinse mouth with water after use to reduce aftertaste and incidence of  candidiasis. Do not swallow. 60 each 0    loratadine (Claritin) 10 mg tablet TAKE 1 TABLET BY MOUTH EVERY DAY 90 tablet 1    mirtazapine (Remeron) 15 mg tablet Take 1/2 Pill AT Bedtime For 2 Weeks Then Increase To A Full Pill AT Bedtime      multivitamin with minerals (Adults Multivitamin) tablet Take by mouth.      omega 3-dha-epa-fish oil (Fish OiL) 1,200 (144-216) mg capsule Take by mouth.      omeprazole (PriLOSEC) 40 mg DR capsule TAKE 1 CAPSULE BY MOUTH TWICE A  capsule 0    rosuvastatin (Crestor) 20 mg tablet Take by mouth once daily.      sod chloride-sodium bicarb (Neilmed Sinus Rinse Complete) nasal rinse Irrigate your nose per instructions twice daily 1 each 3    tamsulosin (Flomax) 0.4 mg 24 hr capsule Take 1 capsule (0.4 mg) by mouth once daily at bedtime.      zinc gluconate 50 mg tablet once every 24 hours.      [DISCONTINUED] omeprazole (PriLOSEC) 40 mg DR capsule Take 1 capsule (40 mg) by mouth 2 times a day. 1 BY MOUTH ONCE DAILY ( INCREASED FROM 20MG ON 6/9/23) 60 capsule 1     No current facility-administered medications on file prior to visit.

## 2025-08-07 ENCOUNTER — EVALUATION (OUTPATIENT)
Dept: PHYSICAL THERAPY | Facility: CLINIC | Age: 85
End: 2025-08-07
Payer: MEDICARE

## 2025-08-07 DIAGNOSIS — M54.16 LUMBAR RADICULOPATHY: Primary | ICD-10-CM

## 2025-08-07 PROCEDURE — 97162 PT EVAL MOD COMPLEX 30 MIN: CPT | Performed by: PHYSICAL THERAPIST

## 2025-08-07 PROCEDURE — 97110 THERAPEUTIC EXERCISES: CPT | Performed by: PHYSICAL THERAPIST

## 2025-08-07 NOTE — PROGRESS NOTES
Physical Therapy     Physical Therapy Evaluation and Treatment      Patient Name:Luis Armando Malin 1940   Encounter Diagnosis   Name Primary?    Lumbar radiculopathy Yes        THERAPY VISIT NUMBER: 1    Today's Date: 8-7-25    REFERRING DR. YOAV PATE     SUBJECTIVE:       PAINFUL RIGHT SI/PSIS AREA      GOALS: HOME PROGRAM     OBJECTIVE: SI MALALIGNMENT--RIGHT LEG WAS ABOUT 1/2 INCH LONGER THAN LEFT TODAY----     ASSESSMENT: WALKING DEBILITY    PLAN AND TREATMENT:  SEE FLOW SHEET PROGRAM IN CHART:    1 X WEEKLY WITH A HEP

## 2025-08-13 ENCOUNTER — APPOINTMENT (OUTPATIENT)
Dept: PRIMARY CARE | Facility: CLINIC | Age: 85
End: 2025-08-13
Payer: MEDICARE

## 2025-08-13 VITALS
HEART RATE: 56 BPM | WEIGHT: 164 LBS | TEMPERATURE: 97.1 F | SYSTOLIC BLOOD PRESSURE: 104 MMHG | HEIGHT: 67 IN | DIASTOLIC BLOOD PRESSURE: 65 MMHG | RESPIRATION RATE: 16 BRPM | BODY MASS INDEX: 25.74 KG/M2

## 2025-08-13 DIAGNOSIS — G20.B1 PARKINSON'S DISEASE WITH DYSKINESIA WITHOUT FLUCTUATING MANIFESTATIONS: ICD-10-CM

## 2025-08-13 DIAGNOSIS — J32.9 CHRONIC SINUSITIS, UNSPECIFIED LOCATION: ICD-10-CM

## 2025-08-13 DIAGNOSIS — I71.40 ABDOMINAL AORTIC ANEURYSM (AAA) WITHOUT RUPTURE, UNSPECIFIED PART: Primary | ICD-10-CM

## 2025-08-13 PROCEDURE — 99214 OFFICE O/P EST MOD 30 MIN: CPT | Performed by: FAMILY MEDICINE

## 2025-08-13 PROCEDURE — 3074F SYST BP LT 130 MM HG: CPT | Performed by: FAMILY MEDICINE

## 2025-08-13 PROCEDURE — 3078F DIAST BP <80 MM HG: CPT | Performed by: FAMILY MEDICINE

## 2025-08-13 PROCEDURE — 1159F MED LIST DOCD IN RCRD: CPT | Performed by: FAMILY MEDICINE

## 2025-08-13 RX ORDER — CARBIDOPA AND LEVODOPA 25; 100 MG/1; MG/1
TABLET ORAL
COMMUNITY
Start: 2025-08-05

## 2025-08-27 ENCOUNTER — HOSPITAL ENCOUNTER (OUTPATIENT)
Dept: RADIOLOGY | Facility: CLINIC | Age: 85
Discharge: HOME | End: 2025-08-27
Payer: MEDICARE

## 2025-08-27 DIAGNOSIS — M54.16 LUMBAR RADICULOPATHY: ICD-10-CM

## 2025-08-27 PROCEDURE — 72148 MRI LUMBAR SPINE W/O DYE: CPT

## 2025-08-27 PROCEDURE — 72148 MRI LUMBAR SPINE W/O DYE: CPT | Performed by: RADIOLOGY

## 2025-09-02 ENCOUNTER — TELEPHONE (OUTPATIENT)
Dept: PRIMARY CARE | Facility: CLINIC | Age: 85
End: 2025-09-02
Payer: MEDICARE

## 2025-09-22 ENCOUNTER — APPOINTMENT (OUTPATIENT)
Dept: OTOLARYNGOLOGY | Facility: CLINIC | Age: 85
End: 2025-09-22
Payer: MEDICARE

## 2026-01-09 ENCOUNTER — APPOINTMENT (OUTPATIENT)
Dept: PRIMARY CARE | Facility: CLINIC | Age: 86
End: 2026-01-09
Payer: MEDICARE